# Patient Record
Sex: FEMALE | Race: BLACK OR AFRICAN AMERICAN | NOT HISPANIC OR LATINO | Employment: FULL TIME | URBAN - METROPOLITAN AREA
[De-identification: names, ages, dates, MRNs, and addresses within clinical notes are randomized per-mention and may not be internally consistent; named-entity substitution may affect disease eponyms.]

---

## 2018-08-24 PROBLEM — R60.0 EDEMA, PERIPHERAL: Status: ACTIVE | Noted: 2018-08-24

## 2023-03-08 ENCOUNTER — TELEPHONE (OUTPATIENT)
Dept: GASTROENTEROLOGY | Facility: AMBULARY SURGERY CENTER | Age: 63
End: 2023-03-08

## 2023-03-13 ENCOUNTER — APPOINTMENT (OUTPATIENT)
Dept: LAB | Facility: HOSPITAL | Age: 63
End: 2023-03-13

## 2023-03-13 ENCOUNTER — HOSPITAL ENCOUNTER (OUTPATIENT)
Dept: RADIOLOGY | Facility: HOSPITAL | Age: 63
Discharge: HOME | End: 2023-03-13

## 2023-03-13 DIAGNOSIS — E13.69 OTHER SPECIFIED DIABETES MELLITUS WITH OTHER SPECIFIED COMPLICATION, UNSPECIFIED WHETHER LONG TERM INSULIN USE (HCC): ICD-10-CM

## 2023-03-13 DIAGNOSIS — Z76.89 ENCOUNTER TO ESTABLISH CARE WITH NEW DOCTOR: ICD-10-CM

## 2023-03-13 DIAGNOSIS — E79.0 URICACIDEMIA: ICD-10-CM

## 2023-03-13 DIAGNOSIS — E78.2 MIXED HYPERLIPIDEMIA: ICD-10-CM

## 2023-03-13 DIAGNOSIS — I10 ESSENTIAL HYPERTENSION: ICD-10-CM

## 2023-03-13 DIAGNOSIS — Z76.89 MENSTRUAL EXTRACTION: ICD-10-CM

## 2023-03-13 LAB
25(OH)D3 SERPL-MCNC: 26.5 NG/ML (ref 30–100)
ALBUMIN SERPL BCP-MCNC: 4.4 G/DL (ref 3.5–5)
ALP SERPL-CCNC: 96 U/L (ref 34–104)
ALT SERPL W P-5'-P-CCNC: 28 U/L (ref 7–52)
ANION GAP SERPL CALCULATED.3IONS-SCNC: 6 MMOL/L (ref 4–13)
AST SERPL W P-5'-P-CCNC: 21 U/L (ref 13–39)
ATRIAL RATE: 67 BPM
BASOPHILS # BLD AUTO: 0.03 THOUSANDS/ÂΜL (ref 0–0.1)
BASOPHILS NFR BLD AUTO: 1 % (ref 0–1)
BILIRUB SERPL-MCNC: 0.5 MG/DL (ref 0.2–1)
BUN SERPL-MCNC: 12 MG/DL (ref 5–25)
CALCIUM SERPL-MCNC: 9.7 MG/DL (ref 8.4–10.2)
CEA SERPL-MCNC: <0.5 NG/ML (ref 0–3)
CHLORIDE SERPL-SCNC: 102 MMOL/L (ref 96–108)
CHOLEST SERPL-MCNC: 201 MG/DL
CO2 SERPL-SCNC: 32 MMOL/L (ref 21–32)
CREAT SERPL-MCNC: 0.87 MG/DL (ref 0.6–1.3)
D DIMER PPP FEU-MCNC: <0.27 UG/ML FEU
EOSINOPHIL # BLD AUTO: 0.07 THOUSAND/ÂΜL (ref 0–0.61)
EOSINOPHIL NFR BLD AUTO: 2 % (ref 0–6)
ERYTHROCYTE [DISTWIDTH] IN BLOOD BY AUTOMATED COUNT: 15.8 % (ref 11.6–15.1)
EST. AVERAGE GLUCOSE BLD GHB EST-MCNC: 137 MG/DL
GFR SERPL CREATININE-BSD FRML MDRD: 71 ML/MIN/1.73SQ M
GLUCOSE P FAST SERPL-MCNC: 125 MG/DL (ref 65–99)
HBA1C MFR BLD: 6.4 %
HCT VFR BLD AUTO: 46.4 % (ref 34.8–46.1)
HDLC SERPL-MCNC: 43 MG/DL
HGB BLD-MCNC: 14.6 G/DL (ref 11.5–15.4)
IMM GRANULOCYTES # BLD AUTO: 0.01 THOUSAND/UL (ref 0–0.2)
IMM GRANULOCYTES NFR BLD AUTO: 0 % (ref 0–2)
IRON SERPL-MCNC: 69 UG/DL (ref 50–170)
LDLC SERPL CALC-MCNC: 112 MG/DL (ref 0–100)
LYMPHOCYTES # BLD AUTO: 2.12 THOUSANDS/ÂΜL (ref 0.6–4.47)
LYMPHOCYTES NFR BLD AUTO: 50 % (ref 14–44)
MAGNESIUM SERPL-MCNC: 2.3 MG/DL (ref 1.9–2.7)
MCH RBC QN AUTO: 25.5 PG (ref 26.8–34.3)
MCHC RBC AUTO-ENTMCNC: 31.5 G/DL (ref 31.4–37.4)
MCV RBC AUTO: 81 FL (ref 82–98)
MONOCYTES # BLD AUTO: 0.31 THOUSAND/ÂΜL (ref 0.17–1.22)
MONOCYTES NFR BLD AUTO: 7 % (ref 4–12)
NEUTROPHILS # BLD AUTO: 1.7 THOUSANDS/ÂΜL (ref 1.85–7.62)
NEUTS SEG NFR BLD AUTO: 40 % (ref 43–75)
NONHDLC SERPL-MCNC: 158 MG/DL
NRBC BLD AUTO-RTO: 0 /100 WBCS
NT-PROBNP SERPL-MCNC: 7 PG/ML
P AXIS: 28 DEGREES
PLATELET # BLD AUTO: 221 THOUSANDS/UL (ref 149–390)
PMV BLD AUTO: 11 FL (ref 8.9–12.7)
POTASSIUM SERPL-SCNC: 4.1 MMOL/L (ref 3.5–5.3)
PR INTERVAL: 162 MS
PROT SERPL-MCNC: 7.6 G/DL (ref 6.4–8.4)
QRS AXIS: -8 DEGREES
QRSD INTERVAL: 84 MS
QT INTERVAL: 460 MS
QTC INTERVAL: 486 MS
RBC # BLD AUTO: 5.73 MILLION/UL (ref 3.81–5.12)
SODIUM SERPL-SCNC: 140 MMOL/L (ref 135–147)
T WAVE AXIS: -39 DEGREES
TRIGL SERPL-MCNC: 231 MG/DL
TSH SERPL DL<=0.05 MIU/L-ACNC: 1.56 UIU/ML (ref 0.45–4.5)
URATE SERPL-MCNC: 6.4 MG/DL (ref 2–7.5)
VENTRICULAR RATE: 67 BPM
WBC # BLD AUTO: 4.24 THOUSAND/UL (ref 4.31–10.16)

## 2023-03-28 ENCOUNTER — HOSPITAL ENCOUNTER (OUTPATIENT)
Dept: RADIOLOGY | Facility: HOSPITAL | Age: 63
Discharge: HOME | End: 2023-03-28
Attending: INTERNAL MEDICINE

## 2023-03-28 DIAGNOSIS — R91.1 LUNG NODULE: ICD-10-CM

## 2023-03-28 RX ADMIN — IOHEXOL 85 ML: 350 INJECTION, SOLUTION INTRAVENOUS at 07:58

## 2023-04-26 ENCOUNTER — OFFICE VISIT (OUTPATIENT)
Dept: FAMILY MEDICINE CLINIC | Facility: CLINIC | Age: 63
End: 2023-04-26

## 2023-04-26 VITALS
HEART RATE: 79 BPM | SYSTOLIC BLOOD PRESSURE: 122 MMHG | HEIGHT: 67 IN | RESPIRATION RATE: 18 BRPM | OXYGEN SATURATION: 97 % | WEIGHT: 202.9 LBS | DIASTOLIC BLOOD PRESSURE: 86 MMHG | BODY MASS INDEX: 31.84 KG/M2

## 2023-04-26 DIAGNOSIS — J02.9 SORE THROAT: Primary | ICD-10-CM

## 2023-04-26 NOTE — LETTER
April 26, 2023     Patient: Lili Sauceda  YOB: 1960  Date of Visit: 4/26/2023      To Whom it May Concern:    Lili Sauceda is under my professional care  Stefanie Cabezasa was seen in my office on 4/26/2023  Stefanie Pearl may return to work on 5/1/23  If you have any questions or concerns, please don't hesitate to call           Sincerely,          Terry Adam DO        CC: No Recipients

## 2023-04-26 NOTE — PROGRESS NOTES
Name: Aleyda Chou      : 1960      MRN: [id number]  Encounter Provider: Trudy Ku DO  Encounter Date: 2023   Encounter department: [place] [address]  Sore throat  - reported fever, sore throat, nasal congestion, difficulty tolerating oral intake, myalgias  Reports symptoms greatly improved except for the sore throat which continues to bother her  Patient took OTC Tylenol/Motrin for fever  Last dose was last night   -  Exam showed +2 tonsular swelling w/ exudate, nasal erythema, and clear lung sounds bilat  - Patient also has vesicular eruption on upper lip which daughter reports happens often when her mother gets ill   - Suspect URI, and given rapid improvement no further action likely needed beyond supportive care  Culture to assess for Strep infection out of caution given intensity of patient initial symptoms   -     Throat culture           Subjective      58 yr old female presents with symptoms since Saturday/  Had reported fever, sore throat, nasal congestion, difficulty tolerating oral intake, myalgias  Reports symptoms greatly improved except for the sore throat which continues to bother her  Patient took OTC Tylenol/Motrin for fever  Last dose was last night  Review of Systems   Constitutional: Positive for appetite change, chills and fever  Negative for fatigue  HENT: Positive for sore throat and trouble swallowing  Negative for congestion, ear pain, hearing loss and rhinorrhea  Eyes: Negative for pain and visual disturbance  Respiratory: Positive for cough  Negative for shortness of breath  Cardiovascular: Negative for chest pain  Gastrointestinal: Negative for constipation, diarrhea, nausea and vomiting  Endocrine: Negative for polyuria  Genitourinary: Negative for difficulty urinating and dysuria  Musculoskeletal: Negative for arthralgias and myalgias  Skin: Positive for rash     Neurological: Negative for dizziness, "light-headedness and headaches  Current Outpatient Medications on File Prior to Visit   Medication Sig   • allopurinol (ZYLOPRIM) 300 mg tablet Take 1 tablet (300 mg total) by mouth daily   • amLODIPine (NORVASC) 5 mg tablet take 1 tablet by mouth once daily   • aspirin (ECOTRIN LOW STRENGTH) 81 mg EC tablet Take 81 mg by mouth daily Last dose 5/26/22   • atorvastatin (LIPITOR) 20 mg tablet Take 1 tablet (20 mg total) by mouth daily   • Blood Pressure Monitoring (SPHYGMOMANOMETER) MISC by Does not apply route daily   • Cholecalciferol (Vitamin D3) 50 MCG (2000 UT) TABS Take 2,000 Units by mouth daily     • hydrochlorothiazide (HYDRODIURIL) 25 mg tablet take 1 tablet by mouth once daily   • ibuprofen (MOTRIN) 400 mg tablet Take 400 mg by mouth   • meloxicam (MOBIC) 15 mg tablet Take 1 tablet by mouth daily as needed   • metFORMIN (GLUCOPHAGE-XR) 500 mg 24 hr tablet Take 1 tablet (500 mg total) by mouth daily with dinner   • Multiple Vitamins-Minerals (multivitamin with minerals) tablet Take 1 tablet by mouth daily Women's 50 plus daily   • pantoprazole (PROTONIX) 40 mg tablet Take 1 tablet (40 mg total) by mouth daily Take pantoprazole twice a day for 2 weeks and then once a day       Objective     /86 (BP Location: Left arm, Patient Position: Sitting, Cuff Size: Standard)   Pulse 79   Resp 18   Ht 5' 7\" (1 702 m)   Wt 92 kg (202 lb 14 4 oz)   SpO2 97%   BMI 31 78 kg/m²     Physical Exam  Constitutional:       General: She is not in acute distress  Appearance: She is ill-appearing  She is not toxic-appearing  HENT:      Head: Normocephalic and atraumatic  Nose:      Comments: Erythema bilat     Mouth/Throat:      Mouth: Mucous membranes are moist       Pharynx: Oropharyngeal exudate and posterior oropharyngeal erythema present  Tonsils: 0 on the right  2+ on the left  Eyes:      Pupils: Pupils are equal, round, and reactive to light     Cardiovascular:      Rate and Rhythm: Normal " rate and regular rhythm  Heart sounds: Normal heart sounds  Pulmonary:      Effort: Pulmonary effort is normal       Breath sounds: Normal breath sounds  Abdominal:      Palpations: Abdomen is soft  Tenderness: There is no abdominal tenderness  Musculoskeletal:         General: No deformity  Cervical back: Normal range of motion  Right lower leg: No edema  Left lower leg: No edema  Skin:     General: Skin is warm and dry  Neurological:      Mental Status: She is alert and oriented to person, place, and time     Psychiatric:         Mood and Affect: Mood normal          Behavior: Behavior normal        Maryellen Weir DO

## 2023-04-28 ENCOUNTER — APPOINTMENT (EMERGENCY)
Dept: RADIOLOGY | Facility: HOSPITAL | Age: 63
End: 2023-04-28

## 2023-04-28 ENCOUNTER — HOSPITAL ENCOUNTER (OUTPATIENT)
Facility: HOSPITAL | Age: 63
Setting detail: OBSERVATION
Discharge: HOME | End: 2023-04-29
Attending: EMERGENCY MEDICINE | Admitting: FAMILY MEDICINE

## 2023-04-28 DIAGNOSIS — B02.9 HERPES ZOSTER: ICD-10-CM

## 2023-04-28 DIAGNOSIS — J02.0 PHARYNGITIS DUE TO GROUP A BETA HEMOLYTIC STREPTOCOCCI: ICD-10-CM

## 2023-04-28 DIAGNOSIS — R68.89: ICD-10-CM

## 2023-04-28 DIAGNOSIS — J02.0 STREP PHARYNGITIS: ICD-10-CM

## 2023-04-28 DIAGNOSIS — R22.0 FACIAL SWELLING: ICD-10-CM

## 2023-04-28 DIAGNOSIS — B02.30 HERPES ZOSTER OPHTHALMICUS OF LEFT EYE: Primary | ICD-10-CM

## 2023-04-28 PROBLEM — E87.6 HYPOKALEMIA: Status: ACTIVE | Noted: 2023-04-28

## 2023-04-28 PROBLEM — M10.9 GOUT: Status: ACTIVE | Noted: 2023-04-28

## 2023-04-28 LAB
ALBUMIN SERPL BCP-MCNC: 3.7 G/DL (ref 3.5–5)
ALP SERPL-CCNC: 94 U/L (ref 46–116)
ALT SERPL W P-5'-P-CCNC: 33 U/L (ref 12–78)
ANION GAP SERPL CALCULATED.3IONS-SCNC: 3 MMOL/L (ref 4–13)
AST SERPL W P-5'-P-CCNC: 21 U/L (ref 5–45)
BACTERIA UR QL AUTO: ABNORMAL /HPF
BASOPHILS # BLD AUTO: 0.06 THOUSANDS/ÂΜL (ref 0–0.1)
BASOPHILS NFR BLD AUTO: 1 % (ref 0–1)
BILIRUB SERPL-MCNC: 0.25 MG/DL (ref 0.2–1)
BILIRUB UR QL STRIP: NEGATIVE
BUN SERPL-MCNC: 8 MG/DL (ref 5–25)
CALCIUM SERPL-MCNC: 9.9 MG/DL (ref 8.3–10.1)
CHLORIDE SERPL-SCNC: 103 MMOL/L (ref 96–108)
CLARITY UR: CLEAR
CO2 SERPL-SCNC: 32 MMOL/L (ref 21–32)
COLOR UR: ABNORMAL
CREAT SERPL-MCNC: 0.85 MG/DL (ref 0.6–1.3)
EOSINOPHIL # BLD AUTO: 0.09 THOUSAND/ÂΜL (ref 0–0.61)
EOSINOPHIL NFR BLD AUTO: 1 % (ref 0–6)
ERYTHROCYTE [DISTWIDTH] IN BLOOD BY AUTOMATED COUNT: 15.3 % (ref 11.6–15.1)
GFR SERPL CREATININE-BSD FRML MDRD: 73 ML/MIN/1.73SQ M
GLUCOSE SERPL-MCNC: 142 MG/DL (ref 65–140)
GLUCOSE SERPL-MCNC: 143 MG/DL (ref 65–140)
GLUCOSE UR STRIP-MCNC: NEGATIVE MG/DL
HCT VFR BLD AUTO: 44.5 % (ref 34.8–46.1)
HGB BLD-MCNC: 14 G/DL (ref 11.5–15.4)
HGB UR QL STRIP.AUTO: NEGATIVE
IMM GRANULOCYTES # BLD AUTO: 0.03 THOUSAND/UL (ref 0–0.2)
IMM GRANULOCYTES NFR BLD AUTO: 0 % (ref 0–2)
KETONES UR STRIP-MCNC: NEGATIVE MG/DL
LEUKOCYTE ESTERASE UR QL STRIP: ABNORMAL
LYMPHOCYTES # BLD AUTO: 3.39 THOUSANDS/ÂΜL (ref 0.6–4.47)
LYMPHOCYTES NFR BLD AUTO: 49 % (ref 14–44)
MCH RBC QN AUTO: 25.5 PG (ref 26.8–34.3)
MCHC RBC AUTO-ENTMCNC: 31.5 G/DL (ref 31.4–37.4)
MCV RBC AUTO: 81 FL (ref 82–98)
MONOCYTES # BLD AUTO: 0.65 THOUSAND/ÂΜL (ref 0.17–1.22)
MONOCYTES NFR BLD AUTO: 9 % (ref 4–12)
MUCOUS THREADS UR QL AUTO: ABNORMAL
NEUTROPHILS # BLD AUTO: 2.78 THOUSANDS/ÂΜL (ref 1.85–7.62)
NEUTS SEG NFR BLD AUTO: 40 % (ref 43–75)
NITRITE UR QL STRIP: NEGATIVE
NON-SQ EPI CELLS URNS QL MICRO: ABNORMAL /HPF
NRBC BLD AUTO-RTO: 0 /100 WBCS
PH UR STRIP.AUTO: 6 [PH]
PLATELET # BLD AUTO: 259 THOUSANDS/UL (ref 149–390)
PMV BLD AUTO: 10.7 FL (ref 8.9–12.7)
POTASSIUM SERPL-SCNC: 3.1 MMOL/L (ref 3.5–5.3)
PROT SERPL-MCNC: 8 G/DL (ref 6.4–8.4)
PROT UR STRIP-MCNC: ABNORMAL MG/DL
RBC # BLD AUTO: 5.48 MILLION/UL (ref 3.81–5.12)
RBC #/AREA URNS AUTO: ABNORMAL /HPF
S PYO DNA THROAT QL NAA+PROBE: DETECTED
SODIUM SERPL-SCNC: 138 MMOL/L (ref 135–147)
SP GR UR STRIP.AUTO: 1.01 (ref 1–1.03)
UROBILINOGEN UR STRIP-ACNC: <2 MG/DL
WBC # BLD AUTO: 7 THOUSAND/UL (ref 4.31–10.16)
WBC #/AREA URNS AUTO: ABNORMAL /HPF

## 2023-04-28 RX ORDER — AMOXICILLIN 500 MG/1
1000 CAPSULE ORAL EVERY 24 HOURS
Status: DISCONTINUED | OUTPATIENT
Start: 2023-04-29 | End: 2023-04-29 | Stop reason: HOSPADM

## 2023-04-28 RX ORDER — ENOXAPARIN SODIUM 100 MG/ML
40 INJECTION SUBCUTANEOUS DAILY
Status: DISCONTINUED | OUTPATIENT
Start: 2023-04-29 | End: 2023-04-29 | Stop reason: HOSPADM

## 2023-04-28 RX ORDER — VALACYCLOVIR HYDROCHLORIDE 1 G/1
1000 TABLET, FILM COATED ORAL EVERY 8 HOURS SCHEDULED
Status: DISCONTINUED | OUTPATIENT
Start: 2023-04-28 | End: 2023-04-28

## 2023-04-28 RX ORDER — POTASSIUM CHLORIDE 20 MEQ/1
40 TABLET, EXTENDED RELEASE ORAL ONCE
Status: COMPLETED | OUTPATIENT
Start: 2023-04-28 | End: 2023-04-28

## 2023-04-28 RX ORDER — AMOXICILLIN 250 MG/1
1000 CAPSULE ORAL EVERY 24 HOURS
Status: DISCONTINUED | OUTPATIENT
Start: 2023-04-29 | End: 2023-04-28

## 2023-04-28 RX ORDER — POTASSIUM CHLORIDE 20MEQ/15ML
40 LIQUID (ML) ORAL ONCE
Status: COMPLETED | OUTPATIENT
Start: 2023-04-28 | End: 2023-04-28

## 2023-04-28 RX ORDER — ACETAMINOPHEN 325 MG/1
650 TABLET ORAL EVERY 6 HOURS PRN
Status: DISCONTINUED | OUTPATIENT
Start: 2023-04-28 | End: 2023-04-29 | Stop reason: HOSPADM

## 2023-04-28 RX ORDER — INSULIN LISPRO 100 [IU]/ML
1-6 INJECTION, SOLUTION INTRAVENOUS; SUBCUTANEOUS
Status: DISCONTINUED | OUTPATIENT
Start: 2023-04-28 | End: 2023-04-29 | Stop reason: HOSPADM

## 2023-04-28 RX ORDER — TETRACAINE HYDROCHLORIDE 5 MG/ML
2 SOLUTION OPHTHALMIC ONCE
Status: COMPLETED | OUTPATIENT
Start: 2023-04-28 | End: 2023-04-28

## 2023-04-28 RX ADMIN — TETRACAINE HYDROCHLORIDE 2 DROP: 5 SOLUTION OPHTHALMIC at 11:57

## 2023-04-28 RX ADMIN — FLUORESCEIN SODIUM 1 STRIP: 1 STRIP OPHTHALMIC at 11:57

## 2023-04-28 RX ADMIN — POTASSIUM CHLORIDE 40 MEQ: 1500 TABLET, EXTENDED RELEASE ORAL at 15:59

## 2023-04-28 RX ADMIN — GANCICLOVIR SODIUM 310 MG: 500 INJECTION, POWDER, LYOPHILIZED, FOR SOLUTION INTRAVENOUS at 21:45

## 2023-04-28 RX ADMIN — SODIUM CHLORIDE 3 G: 9 INJECTION, SOLUTION INTRAVENOUS at 14:26

## 2023-04-28 RX ADMIN — IOHEXOL 100 ML: 350 INJECTION, SOLUTION INTRAVENOUS at 15:47

## 2023-04-28 RX ADMIN — TOBRAMYCIN AND DEXAMETHASONE 0.5 INCH: 3; 1 OINTMENT OPHTHALMIC at 23:10

## 2023-04-28 RX ADMIN — POTASSIUM CHLORIDE 40 MEQ: 1.5 SOLUTION ORAL at 18:31

## 2023-04-28 NOTE — ASSESSMENT & PLAN NOTE
Last know lipid panel 3/13/23 Showing cholesterol 201, Triglyceride 231, HDL 43   · Continue Home Atorvastatin 20mg

## 2023-04-28 NOTE — H&P
H&P - Family Medicine Residency, Geneva Norris 1960, 58 y o  female  MRN: [id number]    Unit/Bed#: SG8 213-01 Encounter: [id number]  Primary Care Provider: Kim Booker MD      Admission Date: 4/28/2023 1115    Assessments & Plans:   Plans discussed with [facility] team and finalization is pending attending physicianattestation  * Herpes zoster ophthalmicus of left eye  Assessment & Plan  Patient presenting with one day of left facial swelling and ocular pruritis in context of ~5 days of sore throat/strep pharyngitis  Also with new eruption of burning vesicular lesions on left nose and left upper lift consistent with herpes zoster infection  Patient appears uncomfortable with pruritis of eye however otherwise stable  Received 1x dose tetracaine eyedrops in ED  · Continue with Ganciclovir 310mg Q12  · If patient improving by tomorrow consider transition to Oral Valacyclovir 1000mg Q8h for 7 days  · Start Tobradex eye drops TID in left eye  · Monitor for progression of swelling/lesions  · IP consult to opthamology for fundoscopic exam, appreciate recommendations    Pharyngitis due to group A beta hemolytic Streptococci  Assessment & Plan  Patient with 5d history of sore throat, progressive dypshagia, multiple days of fever and chills which have since resolved  Was seen by primary provider 4/26 and had a positive rapid strep and today with positive Strep A PCR  Continues to endorse mild throat pain, has some difficulty swallowing and PE significant for 2+ tonsils with left tonsillar exudate  CT in ED significant for findings suggestive of infectious tonsillitis  Received 1x Dose Unasyn 3g in ED  · 4/29, Transition to amoxicillin 1000mg QD for 9 days  · Trend fever curve  · Airway precautions      Hypokalemia  Assessment & Plan  Patient with admission K+ of 3 1, received Ashlee Bauer in ED  Has had reduced PO intake over the past few days, takes home HCTZ    · One additional dose of Kchlor 40 now  · Monitor AM BMP    Gout  Assessment & Plan  Continue PTA medications  · Allopurinol 300mg QD    Hyperlipidemia  Assessment & Plan  Last know lipid panel 3/13/23 Showing cholesterol 201, Triglyceride 231, HDL 43   · Continue Home Atorvastatin 20mg     Type 2 diabetes mellitus without complication, without long-term current use of insulin ([facility])  Assessment & Plan  Lab Results   Component Value Date    HGBA1C 6 4 (H) 03/13/2023       No results for input(s): POCGLU in the last 72 hours  Blood Sugar Average: Last 72 hrs:  Most recent A1c well controlled at 6 4, on home metformin 500mg QD  Hold home medications  · Sliding scale Algorithm 3 while admitted    Hypertension  Assessment & Plan  Blood pressure on admission 156/94  Continue home medications  · Norvasc 5mg daily QD  · Hydrochlorothiazide 25mg QD      Patient Active Problem List   Diagnosis   • Hypertension   • Lower extremity edema   • Type 2 diabetes mellitus without complication, without long-term current use of insulin ([facility] [address] )   • Dysphagia   • Hyperlipidemia   • Arthritis   • Mass of colon   • Shortness of breath   • Herpes zoster ophthalmicus of left eye   • Gout   • Hypokalemia   • Pharyngitis due to group A beta hemolytic Streptococci       Diet: Diet Regular; Regular House; Dysphagia 2-Mechanical Soft; Thin Liquid  VTE Pharm PPX: Enoxaparin (Lovenox)  VTE Mech PPX: sequential compression device    Code Status:  Level 1 - Full Code      Disposition: Admit to Observation under Med-surg   Consult: IP CONSULT TO OPHTHALMOLOGY    Chief Complaints:   Facial Swelling (Woke up with left sided facial swelling)      History of Presenting Illness:   Patient presents to the hospital for evaluation of left facial swelling in the context of 5 days of sore throat, fevers, chills, and sweats  Patient first noticed symptoms on Sunday while at work when she began to feel unwell with fevers, headaches and sore throat   She was sent home and nursed her symptoms with rest, motrin and herbal teas  Not associated with loose stools or urinary symptoms, shortness of breath, or chest pains  Symptoms began to improve over the next few days however sore throat remained, so she went to her primary doctor on the 26th who suspected URI given improvement of symptoms and sent her home  Also noted vesicular eruptions of upper lip  However, this AM patient had increased swelling of the left side of her face with vesicular eruption on the nose and conjunctival injection, and she was brought to the emergency department for further evaluation      ED Management:     ED Triage Vitals   Temperature Pulse Respirations Blood Pressure SpO2   04/28/23 1101 04/28/23 1101 04/28/23 1101 04/28/23 1101 04/28/23 1101   98 3 °F (36 8 °C) 83 16 169/96 99 %      Temp Source Heart Rate Source Patient Position - Orthostatic VS BP Location FiO2 (%)   04/28/23 1101 04/28/23 1418 04/28/23 1418 04/28/23 1418 --   Oral Monitor Lying Left arm       Pain Score       04/28/23 1101       No Pain         Medications   ganciclovir (CYTOVENE) 310 mg in sodium chloride 0 9 % 100 mL IVPB (has no administration in time range)   enoxaparin (LOVENOX) subcutaneous injection 40 mg (has no administration in time range)   acetaminophen (TYLENOL) tablet 650 mg (has no administration in time range)   tobramycin-dexamethasone (TOBRADEX) 0 3-0 1 % ophthalmic ointment 0 5 inch (has no administration in time range)   insulin lispro (HumaLOG) 100 units/mL subcutaneous injection 1-6 Units (1 Units Subcutaneous Not Given 4/28/23 4619)   amoxicillin (AMOXIL) capsule 1,000 mg (has no administration in time range)   fluorescein sodium sterile ophthalmic strip 1 strip (1 strip Left Eye Given by Other 4/28/23 1067)   tetracaine 0 5 % ophthalmic solution 2 drop (2 drops Left Eye Given by Other 4/28/23 7467)   ampicillin-sulbactam (UNASYN) 3 g in sodium chloride 0 9 % 100 mL IVPB (0 g Intravenous Stopped 4/28/23 1515)   potassium chloride (K-DUR,KLOR-CON) CR tablet 40 mEq (40 mEq Oral Given 4/28/23 1559)   iohexol (OMNIPAQUE) 350 MG/ML injection (SINGLE-DOSE) 100 mL (100 mL Intravenous Given 4/28/23 1547)   potassium chloride oral solution 40 mEq (40 mEq Oral Given 4/28/23 1831)       Review of System:   Review of Systems   Constitutional: Positive for chills and fever  Negative for appetite change and fatigue  HENT: Positive for sore throat and trouble swallowing  Negative for congestion, ear discharge, ear pain and sinus pressure  Eyes: Positive for discharge, redness and itching  Respiratory: Negative for choking, chest tightness, shortness of breath and wheezing  Cardiovascular: Negative for chest pain and palpitations  Gastrointestinal: Negative for abdominal pain, diarrhea, nausea and vomiting  Genitourinary: Negative for decreased urine volume, difficulty urinating, dysuria, frequency and urgency  Musculoskeletal: Negative for back pain and neck pain  Skin: Negative for rash and wound  Neurological: Positive for headaches  Negative for dizziness, syncope and light-headedness         History:     Past Medical History:   Diagnosis Date   • Arthritis    • Diabetes mellitus ([facility] [address] )    • Gout    • Hyperlipidemia    • Hypertension      Past Surgical History:   Procedure Laterality Date   • BILATERAL OOPHORECTOMY     • HYSTERECTOMY  01/01/2015   • OOPHORECTOMY Bilateral 2015   • THYROID SURGERY      2001   • UTERINE FIBROID SURGERY      5 taken out 2017 [facility]      Social History     Socioeconomic History   • Marital status: /Civil Union     Spouse name: None   • Number of children: None   • Years of education: None   • Highest education level: None   Occupational History   • None   Tobacco Use   • Smoking status: Former     Types: Cigarettes     Quit date: 2013     Years since quitting: 10 3   • Smokeless tobacco: Never   Vaping Use   • Vaping Use: Never used   Substance and Sexual Activity   • Alcohol use: No   • Drug use: No   • Sexual activity: Yes     Partners: Male   Other Topics Concern   • None   Social History Narrative   • None     Social Determinants of Health     Financial Resource Strain: Not on file   Food Insecurity: Not on file   Transportation Needs: Not on file   Physical Activity: Not on file   Stress: Not on file   Social Connections: Not on file   Intimate Partner Violence: Not on file   Housing Stability: Not on file     Family History   Problem Relation Age of Onset   • Prostate cancer Father 80   • No Known Problems Mother    • No Known Problems Daughter    • No Known Problems Maternal Aunt    • No Known Problems Sister    • No Known Problems Sister        Medications & Allergies:     PTA meds:   Prior to Admission Medications   Prescriptions Last Dose Informant Patient Reported? Taking?    Blood Pressure Monitoring (SPHYGMOMANOMETER) MISC   No No   Sig: by Does not apply route daily   Cholecalciferol (Vitamin D3) 50 MCG (2000 UT) TABS   Yes No   Sig: Take 2,000 Units by mouth daily     Multiple Vitamins-Minerals (multivitamin with minerals) tablet   No No   Sig: Take 1 tablet by mouth daily Women's 50 plus daily   allopurinol (ZYLOPRIM) 300 mg tablet   No No   Sig: Take 1 tablet (300 mg total) by mouth daily   amLODIPine (NORVASC) 5 mg tablet   No No   Sig: take 1 tablet by mouth once daily   aspirin (ECOTRIN LOW STRENGTH) 81 mg EC tablet   Yes No   Sig: Take 81 mg by mouth daily Last dose 5/26/22   atorvastatin (LIPITOR) 20 mg tablet   No No   Sig: Take 1 tablet (20 mg total) by mouth daily   hydrochlorothiazide (HYDRODIURIL) 25 mg tablet   No No   Sig: take 1 tablet by mouth once daily   ibuprofen (MOTRIN) 400 mg tablet   Yes No   Sig: Take 400 mg by mouth   meloxicam (MOBIC) 15 mg tablet   Yes No   Sig: Take 1 tablet by mouth daily as needed   metFORMIN (GLUCOPHAGE-XR) 500 mg 24 hr tablet   No No   Sig: Take 1 tablet (500 mg total) by mouth daily with dinner   pantoprazole (PROTONIX) 40 mg tablet   No No   Sig: Take 1 tablet (40 mg total) by mouth daily Take pantoprazole twice a day for 2 weeks and then once a day      Facility-Administered Medications: None     No Known Allergies    PTA Medications:  No current facility-administered medications on file prior to encounter  Current Outpatient Medications on File Prior to Encounter   Medication Sig Dispense Refill   • allopurinol (ZYLOPRIM) 300 mg tablet Take 1 tablet (300 mg total) by mouth daily 90 tablet 0   • amLODIPine (NORVASC) 5 mg tablet take 1 tablet by mouth once daily 90 tablet 1   • aspirin (ECOTRIN LOW STRENGTH) 81 mg EC tablet Take 81 mg by mouth daily Last dose 5/26/22     • atorvastatin (LIPITOR) 20 mg tablet Take 1 tablet (20 mg total) by mouth daily 90 tablet 1   • Blood Pressure Monitoring (SPHYGMOMANOMETER) MISC by Does not apply route daily 1 each 0   • Cholecalciferol (Vitamin D3) 50 MCG (2000 UT) TABS Take 2,000 Units by mouth daily       • hydrochlorothiazide (HYDRODIURIL) 25 mg tablet take 1 tablet by mouth once daily 90 tablet 1   • ibuprofen (MOTRIN) 400 mg tablet Take 400 mg by mouth     • meloxicam (MOBIC) 15 mg tablet Take 1 tablet by mouth daily as needed     • metFORMIN (GLUCOPHAGE-XR) 500 mg 24 hr tablet Take 1 tablet (500 mg total) by mouth daily with dinner 90 tablet 0   • Multiple Vitamins-Minerals (multivitamin with minerals) tablet Take 1 tablet by mouth daily Women's 50 plus daily 90 tablet 1   • pantoprazole (PROTONIX) 40 mg tablet Take 1 tablet (40 mg total) by mouth daily Take pantoprazole twice a day for 2 weeks and then once a day 60 tablet 11         Objective & Vitals:     Vitals: /99   Pulse 75   Temp 98 1 °F (36 7 °C)   Resp 20   SpO2 98%     No intake or output data in the 24 hours ending 04/28/23 1843    Invasive Devices     Peripheral Intravenous Line  Duration           Peripheral IV 04/28/23 Right Antecubital <1 day                  Labs:    I have personally reviewed pertinent reports      Recent Results (from the past 24 hour(s))   CBC and differential    Collection Time: 04/28/23 11:53 AM   Result Value Ref Range    WBC 7 00 4 31 - 10 16 Thousand/uL    RBC 5 48 (H) 3 81 - 5 12 Million/uL    Hemoglobin 14 0 11 5 - 15 4 g/dL    Hematocrit 44 5 34 8 - 46 1 %    MCV 81 (L) 82 - 98 fL    MCH 25 5 (L) 26 8 - 34 3 pg    MCHC 31 5 31 4 - 37 4 g/dL    RDW 15 3 (H) 11 6 - 15 1 %    MPV 10 7 8 9 - 12 7 fL    Platelets 968 511 - 714 Thousands/uL    nRBC 0 /100 WBCs    Neutrophils Relative 40 (L) 43 - 75 %    Immat GRANS % 0 0 - 2 %    Lymphocytes Relative 49 (H) 14 - 44 %    Monocytes Relative 9 4 - 12 %    Eosinophils Relative 1 0 - 6 %    Basophils Relative 1 0 - 1 %    Neutrophils Absolute 2 78 1 85 - 7 62 Thousands/µL    Immature Grans Absolute 0 03 0 00 - 0 20 Thousand/uL    Lymphocytes Absolute 3 39 0 60 - 4 47 Thousands/µL    Monocytes Absolute 0 65 0 17 - 1 22 Thousand/µL    Eosinophils Absolute 0 09 0 00 - 0 61 Thousand/µL    Basophils Absolute 0 06 0 00 - 0 10 Thousands/µL   Comprehensive metabolic panel    Collection Time: 04/28/23 11:53 AM   Result Value Ref Range    Sodium 138 135 - 147 mmol/L    Potassium 3 1 (L) 3 5 - 5 3 mmol/L    Chloride 103 96 - 108 mmol/L    CO2 32 21 - 32 mmol/L    ANION GAP 3 (L) 4 - 13 mmol/L    BUN 8 5 - 25 mg/dL    Creatinine 0 85 0 60 - 1 30 mg/dL    Glucose 143 (H) 65 - 140 mg/dL    Calcium 9 9 8 3 - 10 1 mg/dL    AST 21 5 - 45 U/L    ALT 33 12 - 78 U/L    Alkaline Phosphatase 94 46 - 116 U/L    Total Protein 8 0 6 4 - 8 4 g/dL    Albumin 3 7 3 5 - 5 0 g/dL    Total Bilirubin 0 25 0 20 - 1 00 mg/dL    eGFR 73 ml/min/1 73sq m   Strep A PCR    Collection Time: 04/28/23 11:53 AM    Specimen: Throat   Result Value Ref Range    STREP A PCR Detected (A) Not Detected   UA w Reflex to Microscopic w Reflex to Culture    Collection Time: 04/28/23  1:21 PM    Specimen: Urine, Clean Catch   Result Value Ref Range    Color, UA Light Yellow     Clarity, UA Clear Specific [place], UA 1 014 1 003 - 1 030    pH, UA 6 0 4 5, 5 0, 5 5, 6 0, 6 5, 7 0, 7 5, 8 0    Leukocytes, UA Large (A) Negative    Nitrite, UA Negative Negative    Protein, UA Trace (A) Negative mg/dl    Glucose, UA Negative Negative mg/dl    Ketones, UA Negative Negative mg/dl    Urobilinogen, UA <2 0 <2 0 mg/dl mg/dl    Bilirubin, UA Negative Negative    Occult Blood, UA Negative Negative   Urine Microscopic    Collection Time: 04/28/23  1:21 PM   Result Value Ref Range    RBC, UA 2-4 (A) None Seen, 1-2 /hpf    WBC, UA 4-10 (A) None Seen, 1-2 /hpf    Epithelial Cells Occasional None Seen, Occasional /hpf    Bacteria, UA None Seen None Seen, Occasional /hpf    MUCUS THREADS Occasional (A) None Seen   Fingerstick Glucose (POCT)    Collection Time: 04/28/23  6:35 PM   Result Value Ref Range    POC Glucose 142 (H) 65 - 140 mg/dl       Imaging:     I have personally reviewed pertinent reports  CT soft tissue neck with contrast    Result Date: 4/28/2023  Impression: Prominent left greater than right palatine tonsils and slight prominence of the lingual tonsils  There is a mildly enlarged lymph node, left level 2A distribution along the anterior margin of the sternocleidomastoid muscle and posterior to the submandibular gland  Findings are similar to prior study from 2017 suggestive of a similar infectious/inflammatory tonsillitis  Recommend follow-up imaging if symptoms persist  Workstation performed: [id number]       EKG, Pathology, and Other Studies:   I have personally reviewed pertinent reports  Physical Exam:   Physical Exam  Constitutional:       General: She is not in acute distress  Appearance: She is normal weight  HENT:      Head: Normocephalic and atraumatic  Right Ear: External ear normal       Left Ear: External ear normal       Nose:      Comments: Left Nasal Vesicle     Mouth/Throat:      Lips: Lesions present  Mouth: No injury or oral lesions  Pharynx: Uvula midline  Oropharyngeal exudate: Left  Tonsils: Tonsillar exudate (Left) present  2+ on the right  2+ on the left  Eyes:      General: Vision grossly intact  Left eye: No discharge  Extraocular Movements: Extraocular movements intact  Conjunctiva/sclera:      Left eye: Left conjunctiva is injected  No exudate  Pupils: Pupils are equal, round, and reactive to light  Cardiovascular:      Rate and Rhythm: Normal rate and regular rhythm  Heart sounds: Normal heart sounds  No murmur heard  Pulmonary:      Effort: No respiratory distress  Breath sounds: Normal breath sounds  No wheezing  Abdominal:      General: Bowel sounds are normal       Palpations: Abdomen is soft  Tenderness: There is no abdominal tenderness  Musculoskeletal:      Cervical back: Normal range of motion  No tenderness  Skin:     General: Skin is warm and dry  Neurological:      General: No focal deficit present  Mental Status: She is alert and oriented to person, place, and time     Psychiatric:         Mood and Affect: Mood normal        Marlen Verdin MD  PGY-1, Family Medicine  04/28/23  6:43 PM

## 2023-04-28 NOTE — ASSESSMENT & PLAN NOTE
Blood pressure on admission 156/94   Continue home medication  · Amlodipine 5mg   · Hydrochlorothiazide 25mg

## 2023-04-28 NOTE — ASSESSMENT & PLAN NOTE
Patient with 5d history of sore throat, progressive dypshagia, multiple days of fever and chills which have since resolved  Was seen by primary provider 4/26 and had a positive rapid strep and today with positive Strep A PCR  Continues to endorse mild throat pain, has some difficulty swallowing and PE significant for 2+ tonsils with left tonsillar exudate  CT in ED significant for findings suggestive of infectious tonsillitis   Received 1x Dose Unasyn 3g in ED  · 4/29, Transition to amoxicillin 1000mg QD for 9 days  · Trend fever curve  · Airway precautions

## 2023-04-28 NOTE — ED ATTENDING ATTESTATION
4/28/2023  IBonifacio MD, saw and evaluated the patient  I have discussed the patient with the resident/non-physician practitioner and agree with the resident's/non-physician practitioner's findings, Plan of Care, and MDM as documented in the resident's/non-physician practitioner's note, except where noted  All available labs and Radiology studies were reviewed  I was present for key portions of any procedure(s) performed by the resident/non-physician practitioner and I was immediately available to provide assistance  At this point I agree with the current assessment done in the Emergency Department  I have conducted an independent evaluation of this patient a history and physical is as follows: This is an evaluation of a 79-year-old female with past medical history of both hypertension and diabetes who presents to the emergency department complaining of facial swelling and sore throat  Patient states that her symptoms actually began on Sunday evening  At that time she developed a sore throat and then shortly thereafter felt unwell with fevers and headaches  Those symptoms began to improve however she continued to have ongoing sore throat  No cough or congestion otherwise  Positive loose stools with nausea no vomiting  Denies any blood in her stools  She also notes that her loose stools have improved however over the last day she now is complaining of persistent sore throat with left-sided facial swelling and lesions to the tip of her nose as well as her lip  She also has left eye injection with discharge  She currently denies any headache lightheadedness or dizziness  Denies any visual changes  Denies any pain with movement of eye  No neck pain  No chest pain or shortness of breath  No abdominal pain  Denies any nausea vomiting or diarrhea at this time  No urinary symptoms  No focal numbness weakness or tingling    She was seen by her PCP on 4/26 for symptoms of sore throat "and fever  Patient was thought to have a viral infection at that time and advised supportive care  Presents now secondary to the new swelling of her face and eye injection  On exam patient is nontoxic but mildly uncomfortable appearing  Blood pressure mildly elevated  HEENT is normocephalic and atraumatic  Patient does have mild swelling and erythema to her maxilla along the left side as well as some minor swelling to the lower left eyelid  She has conjunctival injection on the left with slight conjunctival discharge  Extraocular muscles intact bilaterally  Pupils equal round reactive to light  No pain with periorbital palpation or with eye movement  TMs are clear bilaterally  Posterior oropharynx with edematous and erythematous tonsils with exudate left slightly greater than right  Midline uvula  No pooling of secretions  Patient has vesicular lesions over the tip of her nose concerning for Bullard sign as well as on her left upper lip she describes these lesions as burning  No obvious lymphadenopathy  No crepitus  Heart is regular rate  No murmurs  Lungs are clear no wheezing rhonchi rales  Abdomen soft positive bowel sounds rebound or guarding  No other areas of rash  Awake alert oriented and appropriate  Assessment and plan 41-year-old female presents with multiple symptoms concerning for herpetic infection as well as possible viral versus bacterial pharyngitis  Given progression of symptoms and lesions on face/nose concern for zoster  Will require further ocular exam with staining to examine for presence or absence of dendrites  Given worsening sore throat and swelling will obtain imaging  We will get basic blood work  Will reevaluate and treat accordingly  Portions of the record may have been created with voice recognition software  Occasional wrong word or “sound-a-like\" substitutions may have occurred due to the inherent limitations of voice recognition software    Review " chart carefully and recognize, using context, where substitutions have occurred  ED Course     Review of patient's outpatient PCP visit  Will treat with IV abx  Will also require antivirals  Discuss with optho  Given history of DM, facial swelling and concern for zoster, will likely require admission       Critical Care Time  Procedures

## 2023-04-28 NOTE — ASSESSMENT & PLAN NOTE
Lab Results   Component Value Date    HGBA1C 6 4 (H) 03/13/2023       Recent Labs     04/28/23  1835 04/29/23  0544   POCGLU 142* 117       Blood Sugar Average: Last 72 hrs:  Most recent A1c well controlled at 6 4, on home metformin 500mg QD    Hold home medications  · Sliding scale Algorithm 3 while admitted

## 2023-04-28 NOTE — ED PROVIDER NOTES
History  Chief Complaint   Patient presents with   • Facial Swelling     Woke up with left sided facial swelling     71-year-old female with a past medical history of diabetes, hypertension, hyperlipidemia, and gout presents with facial swelling  Patient states a few days ago she developed a sore throat, cough, and congestion  She states that the symptoms improved, but this morning she woke up with facial swelling on the left side of her face  The swelling is from below the left eyelid down to the chin  Patient states that she initially could not open her eye when she woke up this morning because it was swollen shut  She states that there was no discharge or crusting  She is now able to open her eye  No vision changes  It is not painful  Patient does not note any swelling inside of her mouth  She has not had any fevers recently  No headache, lightheadedness, or dizziness  Patient also has a rash on and under her nose on the left side of her face  She notes that she has gotten a rash similar to this from time to time  She has never had it evaluated or done anything about it  Prior to Admission Medications   Prescriptions Last Dose Informant Patient Reported? Taking?    Blood Pressure Monitoring (SPHYGMOMANOMETER) MISC   No No   Sig: by Does not apply route daily   Cholecalciferol (Vitamin D3) 50 MCG (2000 UT) TABS   Yes No   Sig: Take 2,000 Units by mouth daily     Multiple Vitamins-Minerals (multivitamin with minerals) tablet   No No   Sig: Take 1 tablet by mouth daily Women's 50 plus daily   allopurinol (ZYLOPRIM) 300 mg tablet   No No   Sig: Take 1 tablet (300 mg total) by mouth daily   amLODIPine (NORVASC) 5 mg tablet   No No   Sig: take 1 tablet by mouth once daily   aspirin (ECOTRIN LOW STRENGTH) 81 mg EC tablet   Yes No   Sig: Take 81 mg by mouth daily Last dose 5/26/22   atorvastatin (LIPITOR) 20 mg tablet   No No   Sig: Take 1 tablet (20 mg total) by mouth daily   hydrochlorothiazide (HYDRODIURIL) 25 mg tablet   No No   Sig: take 1 tablet by mouth once daily   ibuprofen (MOTRIN) 400 mg tablet   Yes No   Sig: Take 400 mg by mouth   meloxicam (MOBIC) 15 mg tablet   Yes No   Sig: Take 1 tablet by mouth daily as needed   metFORMIN (GLUCOPHAGE-XR) 500 mg 24 hr tablet   No No   Sig: Take 1 tablet (500 mg total) by mouth daily with dinner   pantoprazole (PROTONIX) 40 mg tablet   No No   Sig: Take 1 tablet (40 mg total) by mouth daily Take pantoprazole twice a day for 2 weeks and then once a day      Facility-Administered Medications: None       Past Medical History:   Diagnosis Date   • Arthritis    • Diabetes mellitus ([facility] [address] )    • Gout    • Hyperlipidemia    • Hypertension        Past Surgical History:   Procedure Laterality Date   • BILATERAL OOPHORECTOMY     • HYSTERECTOMY  01/01/2015   • OOPHORECTOMY Bilateral 2015   • THYROID SURGERY      2001   • UTERINE FIBROID SURGERY      5 taken out 2017 [facility]        Family History   Problem Relation Age of Onset   • Prostate cancer Father 80   • No Known Problems Mother    • No Known Problems Daughter    • No Known Problems Maternal Aunt    • No Known Problems Sister    • No Known Problems Sister      I have reviewed and agree with the history as documented  E-Cigarette/Vaping   • E-Cigarette Use Never User      E-Cigarette/Vaping Substances   • Nicotine No    • THC No    • CBD No    • Flavoring No    • Other No    • Unknown No      Social History     Tobacco Use   • Smoking status: Former     Types: Cigarettes     Quit date: 2013     Years since quitting: 10 3   • Smokeless tobacco: Never   Vaping Use   • Vaping Use: Never used   Substance Use Topics   • Alcohol use: No   • Drug use: No        Review of Systems   Constitutional: Negative for chills, fatigue and fever  HENT: Positive for facial swelling  Negative for congestion, rhinorrhea and sore throat  Eyes: Positive for redness   Negative for pain, discharge and visual disturbance  Respiratory: Negative for cough, chest tightness, shortness of breath and wheezing  Cardiovascular: Negative for chest pain and palpitations  Gastrointestinal: Negative for abdominal pain, diarrhea, nausea and vomiting  Endocrine: Negative  Genitourinary: Negative for difficulty urinating and hematuria  Musculoskeletal: Negative for back pain and myalgias  Skin: Positive for rash  Negative for pallor  Allergic/Immunologic: Negative  Neurological: Negative for dizziness, weakness, light-headedness and headaches  Hematological: Negative  Physical Exam  ED Triage Vitals   Temperature Pulse Respirations Blood Pressure SpO2   04/28/23 1101 04/28/23 1101 04/28/23 1101 04/28/23 1101 04/28/23 1101   98 3 °F (36 8 °C) 83 16 169/96 99 %      Temp Source Heart Rate Source Patient Position - Orthostatic VS BP Location FiO2 (%)   04/28/23 1101 04/28/23 1418 04/28/23 1418 04/28/23 1418 --   Oral Monitor Lying Left arm       Pain Score       04/28/23 1101       No Pain             Orthostatic Vital Signs  Vitals:    04/28/23 1823 04/28/23 2258 04/29/23 0728 04/29/23 1509   BP: 151/99 150/98 (!) 157/105 163/94   Pulse: 75 73 65 78   Patient Position - Orthostatic VS:   Lying        Physical Exam  Vitals and nursing note reviewed  Constitutional:       General: She is not in acute distress  Appearance: Normal appearance  She is not ill-appearing  HENT:      Head: Normocephalic and atraumatic  Comments: Patient has swelling from under her left eyelid down to the mandible  No overlying erythema  Nontender  Right Ear: Tympanic membrane, ear canal and external ear normal       Left Ear: Tympanic membrane, ear canal and external ear normal       Mouth/Throat:      Comments: Slight erythema in the pharynx  No tonsillar exudates  Tonsils are both enlarged, about 2+  Floor the mouth is soft and nontender  No intraoral swelling    Eyes:      Conjunctiva/sclera: Conjunctivae normal    Cardiovascular:      Rate and Rhythm: Normal rate and regular rhythm  Pulmonary:      Effort: Pulmonary effort is normal  No respiratory distress  Abdominal:      General: Abdomen is flat  Palpations: Abdomen is soft  Musculoskeletal:         General: Normal range of motion  Cervical back: Normal range of motion and neck supple  Skin:     General: Skin is warm and dry  Comments: Patient has vesicular eruption under her nose on the left side of her face  She has 1 vesicular eruption on the tip of her nose as well  Neurological:      General: No focal deficit present  Mental Status: She is alert and oriented to person, place, and time           ED Medications  Medications   fluorescein sodium sterile ophthalmic strip 1 strip (1 strip Left Eye Given by Other 4/28/23 1157)   tetracaine 0 5 % ophthalmic solution 2 drop (2 drops Left Eye Given by Other 4/28/23 1157)   ampicillin-sulbactam (UNASYN) 3 g in sodium chloride 0 9 % 100 mL IVPB (0 g Intravenous Stopped 4/28/23 1515)   potassium chloride (K-DUR,KLOR-CON) CR tablet 40 mEq (40 mEq Oral Given 4/28/23 1559)   iohexol (OMNIPAQUE) 350 MG/ML injection (SINGLE-DOSE) 100 mL (100 mL Intravenous Given 4/28/23 1547)   potassium chloride oral solution 40 mEq (40 mEq Oral Given 4/28/23 1831)       Diagnostic Studies  Results Reviewed     Procedure Component Value Units Date/Time    Urine Microscopic [[id number]]  (Abnormal) Collected: 04/28/23 1321    Lab Status: Final result Specimen: Urine, Clean Catch Updated: 04/28/23 1331     RBC, UA 2-4 /hpf      WBC, UA 4-10 /hpf      Epithelial Cells Occasional /hpf      Bacteria, UA None Seen /hpf      MUCUS THREADS Occasional    UA w Reflex to Microscopic w Reflex to Culture [[id number]]  (Abnormal) Collected: 04/28/23 1321    Lab Status: Final result Specimen: Urine, Clean Catch Updated: 04/28/23 1329     Color, UA Light Yellow     Clarity, UA Clear     Specific Gravity, UA 1 014     pH, UA 6 0 Leukocytes, UA Large     Nitrite, UA Negative     Protein, UA Trace mg/dl      Glucose, UA Negative mg/dl      Ketones, UA Negative mg/dl      Urobilinogen, UA <2 0 mg/dl      Bilirubin, UA Negative     Occult Blood, UA Negative    Comprehensive metabolic panel [[id number]]  (Abnormal) Collected: 04/28/23 1153    Lab Status: Final result Specimen: Blood from Arm, Right Updated: 04/28/23 1301     Sodium 138 mmol/L      Potassium 3 1 mmol/L      Chloride 103 mmol/L      CO2 32 mmol/L      ANION GAP 3 mmol/L      BUN 8 mg/dL      Creatinine 0 85 mg/dL      Glucose 143 mg/dL      Calcium 9 9 mg/dL      AST 21 U/L      ALT 33 U/L      Alkaline Phosphatase 94 U/L      Total Protein 8 0 g/dL      Albumin 3 7 g/dL      Total Bilirubin 0 25 mg/dL      eGFR 73 ml/min/1 73sq m     Narrative:      [facility] guidelines for Chronic Kidney Disease (CKD):   •  Stage 1 with normal or high GFR (GFR > 90 mL/min/1 73 square meters)  •  Stage 2 Mild CKD (GFR = 60-89 mL/min/1 73 square meters)  •  Stage 3A Moderate CKD (GFR = 45-59 mL/min/1 73 square meters)  •  Stage 3B Moderate CKD (GFR = 30-44 mL/min/1 73 square meters)  •  Stage 4 Severe CKD (GFR = 15-29 mL/min/1 73 square meters)  •  Stage 5 End Stage CKD (GFR <15 mL/min/1 73 square meters)  Note: GFR calculation is accurate only with a steady state creatinine    Strep A PCR [[id number]]  (Abnormal) Collected: 04/28/23 1153    Lab Status: Final result Specimen: Throat Updated: 04/28/23 1224     STREP A PCR Detected    CBC and differential [[id number]]  (Abnormal) Collected: 04/28/23 1153    Lab Status: Final result Specimen: Blood from Arm, Right Updated: 04/28/23 1200     WBC 7 00 Thousand/uL      RBC 5 48 Million/uL      Hemoglobin 14 0 g/dL      Hematocrit 44 5 %      MCV 81 fL      MCH 25 5 pg      MCHC 31 5 g/dL      RDW 15 3 %      MPV 10 7 fL      Platelets 248 Thousands/uL      nRBC 0 /100 WBCs      Neutrophils Relative 40 %      Immat GRANS % 0 % Lymphocytes Relative 49 %      Monocytes Relative 9 %      Eosinophils Relative 1 %      Basophils Relative 1 %      Neutrophils Absolute 2 78 Thousands/µL      Immature Grans Absolute 0 03 Thousand/uL      Lymphocytes Absolute 3 39 Thousands/µL      Monocytes Absolute 0 65 Thousand/µL      Eosinophils Absolute 0 09 Thousand/µL      Basophils Absolute 0 06 Thousands/µL                  CT soft tissue neck with contrast   Final Result by Gene Vella Fabry, DO (04/28 1622)      Prominent left greater than right palatine tonsils and slight prominence of the lingual tonsils  There is a mildly enlarged lymph node, left level 2A distribution along the anterior margin of the sternocleidomastoid muscle and posterior to the    submandibular gland  Findings are similar to prior study from 2017 suggestive of a similar infectious/inflammatory tonsillitis  Recommend follow-up imaging if symptoms persist       Workstation performed: [id number]               Procedures  Procedures      ED Course  ED Course as of 04/30/23 1854 Fri Apr 28, 2023   1537 Potassium(!): 3 1  Repleted    [id number] texted for admission                                       Medical Decision Making  20-year-old female presents with left-sided facial swelling and a vesicular rash  The rash seems concerning for herpes  Patient has a positive Bullard sign  Patient will need antivirals for treatment  We will stain the underlying to rule out dendrites on the cornea  The rash does not track inside the ear  Patient recently had a sore throat and now has left-sided facial swelling  We will check for strep to see if this is the cause of the swelling  We will get a CT of the neck to further evaluate the swelling  We will check CBC, CMP, sed rate, and CRP  Fluorescein staining was done in the eye  Patient had no dendrites, ulcers, or corneal abrasions  Patient strep test was positive  She was started on IV Rocephin    Discussed admission for airway management  Patient was started on ganciclovir for herpes  Discussed case with ophthalmology and Dr Debra Wilder recommended TobraDex drops  Case was discussed with medicine and patient was admitted to family medicine for further management  Facial swelling: acute illness or injury that poses a threat to life or bodily functions  Herpes zoster: acute illness or injury that poses a threat to life or bodily functions  Bullard's sign: acute illness or injury that poses a threat to life or bodily functions  Strep pharyngitis: acute illness or injury  Amount and/or Complexity of Data Reviewed  External Data Reviewed:      Details: Reviewed past medical history and medications  Labs: ordered  Decision-making details documented in ED Course  Radiology: ordered  Risk  Prescription drug management  Decision regarding hospitalization  Disposition  Final diagnoses:   Strep pharyngitis   Facial swelling   Herpes zoster   Bullard's sign     Time reflects when diagnosis was documented in both MDM as applicable and the Disposition within this note     Time User Action Codes Description Comment    4/28/2023  5:41 PM Jay Be Dvorište Add [J02 0] Strep pharyngitis     4/28/2023  5:41 PM Jay Be Dvorište Add [R22 0] Facial swelling     4/28/2023  5:41 PM HaJay latif Dvorište Add [B02 9] Herpes zoster     4/28/2023  5:41 PM Kimberly Chavez Add [R68 89] Bullard's sign     4/29/2023  3:24 PM Emmanuel Segal Add [J02 0] Pharyngitis due to group A beta hemolytic Streptococci     4/29/2023  3:24 PM Emmanuel Segal Add [B02 30] Herpes zoster ophthalmicus of left eye       ED Disposition     ED Disposition   Admit    Condition   Stable    Date/Time   Fri Apr 28, 2023  5:40 PM    Comment   Case was discussed with Dr Keyana Stauffer and the patient's admission status was agreed to be Admission Status: inpatient status to the service of Dr Jack Woody              Follow-up Information     Follow up With Specialties Details Why Contact Info    Lola Vann MD Ophthalmology Schedule an appointment as soon as possible for a visit in 1 week(s)  Joao Lopez   [address] [address]      [address]  [facility] Schedule an appointment as soon as possible for a visit  [address]  [address] [facility] [address] [facility]            Discharge Medication List as of 4/29/2023  3:54 PM      START taking these medications    Details   amoxicillin (AMOXIL) 500 mg capsule Take 2 capsules (1,000 mg total) by mouth every 24 hours for 9 doses, Starting Sat 4/29/2023, Until Mon 5/8/2023, Normal      tobramycin-dexamethasone (TOBRADEX) ophthalmic ointment Administer 0 5 inches into the left eye 3 (three) times a day, Starting Sat 4/29/2023, Normal      valACYclovir (VALTREX) 1,000 mg tablet Take 1 tablet (1,000 mg total) by mouth every 8 (eight) hours for 18 doses, Starting Sat 4/29/2023, Until Fri 5/5/2023, Normal         CONTINUE these medications which have NOT CHANGED    Details   allopurinol (ZYLOPRIM) 300 mg tablet Take 1 tablet (300 mg total) by mouth daily, Starting Tue 9/18/2018, Normal      amLODIPine (NORVASC) 5 mg tablet take 1 tablet by mouth once daily, Normal      aspirin (ECOTRIN LOW STRENGTH) 81 mg EC tablet Take 81 mg by mouth daily Last dose 5/26/22, Historical Med      atorvastatin (LIPITOR) 20 mg tablet Take 1 tablet (20 mg total) by mouth daily, Starting Wed 2/23/2022, Normal      Blood Pressure Monitoring (SPHYGMOMANOMETER) MISC by Does not apply route daily, Starting Fri 8/24/2018, Normal      Cholecalciferol (Vitamin D3) 50 MCG (2000 UT) TABS Take 2,000 Units by mouth daily  , Historical Med      hydrochlorothiazide (HYDRODIURIL) 25 mg tablet take 1 tablet by mouth once daily, Normal      ibuprofen (MOTRIN) 400 mg tablet Take 400 mg by mouth, Historical Med      meloxicam (MOBIC) 15 mg tablet Take 1 tablet by mouth daily as needed, Starting Mon 9/12/2022, Historical Med      metFORMIN (GLUCOPHAGE-XR) 500 mg 24 hr tablet Take 1 tablet (500 mg total) by mouth daily with dinner, Starting Wed 2/23/2022, Normal      Multiple Vitamins-Minerals (multivitamin with minerals) tablet Take 1 tablet by mouth daily Women's 50 plus daily, Starting Wed 2/23/2022, Normal      pantoprazole (PROTONIX) 40 mg tablet Take 1 tablet (40 mg total) by mouth daily Take pantoprazole twice a day for 2 weeks and then once a day, Starting Tue 4/5/2022, Normal           No discharge procedures on file  PDMP Review     None           ED Provider  Attending physically available and evaluated Darlene Chávez I managed the patient along with the ED Attending      Electronically Signed by         Zoie Talbot DO  04/30/23 0241

## 2023-04-28 NOTE — LETTER
[address] [address]  Dept: [phone]    April 29, 2023     Patient: Amalia Ghotra   YOB: 1960   Date of Visit: 4/28/2023       To Whom it May Concern:    Amalia Ghotra is under my professional care  She was seen in the hospital from 4/28/2023 to 04/29/23  She may return to work on 5/8/23 without limitations  If you have any questions or concerns, please don't hesitate to call           Sincerely,          Cameron Segal,

## 2023-04-28 NOTE — LETTER
[address] [address]  [address] [address]  Dept: [phone]    April 29, 2023     Patient: Weldon Cushing   YOB: 1960   Date of Visit: 4/28/2023       To Whom it May Concern:    Weldon Cushing is under my professional care  She was seen in the hospital from 4/28/2023 to 04/29/23  She may return to school on 5/8/23 without limitations  If you have any questions or concerns, please don't hesitate to call           Sincerely,          Cady Segal,

## 2023-04-28 NOTE — ASSESSMENT & PLAN NOTE
Patient presenting with one day of left facial swelling and ocular pruritis in context of ~5 days of sore throat/strep pharyngitis  Also with new eruption of burning vesicular lesions on left nose and left upper lift consistent with herpes zoster infection  Patient appears uncomfortable with pruritis of eye however otherwise stable  Received 1x dose tetracaine eyedrops in ED     · Continue with Ganciclovir 310mg Q12  · If patient improving by tomorrow consider transition to Oral Valacyclovir 1000mg Q8h for 7 days  · Start Tobradex eye drops TID in left eye  · Monitor for progression of swelling/lesions  · IP consult to opthamology for fundoscopic exam, appreciate recommendations

## 2023-04-28 NOTE — ASSESSMENT & PLAN NOTE
Still hypertensive this AM  Continue home medications    · Norvasc 5mg daily QD  · Hydrochlorothiazide 25mg QD  · Restart home meds  · Asymptomatic

## 2023-04-28 NOTE — ASSESSMENT & PLAN NOTE
Patient with admission K+ of 3 1, received 40meq KChlor in ED  Has had reduced PO intake over the past few days, takes home HCTZ    · Monitor AM BMP - K+ > 4 0, no need for additional K+ supplementation

## 2023-04-29 VITALS
TEMPERATURE: 98.4 F | SYSTOLIC BLOOD PRESSURE: 163 MMHG | DIASTOLIC BLOOD PRESSURE: 94 MMHG | RESPIRATION RATE: 20 BRPM | HEART RATE: 78 BPM | OXYGEN SATURATION: 97 %

## 2023-04-29 LAB
ALBUMIN SERPL BCP-MCNC: 3.2 G/DL (ref 3.5–5)
ALP SERPL-CCNC: 85 U/L (ref 46–116)
ALT SERPL W P-5'-P-CCNC: 27 U/L (ref 12–78)
ANION GAP SERPL CALCULATED.3IONS-SCNC: 0 MMOL/L (ref 4–13)
AST SERPL W P-5'-P-CCNC: 13 U/L (ref 5–45)
B-HEM STREP SPEC QL CULT: NEGATIVE
BASOPHILS # BLD AUTO: 0.05 THOUSANDS/ÂΜL (ref 0–0.1)
BASOPHILS NFR BLD AUTO: 1 % (ref 0–1)
BILIRUB SERPL-MCNC: 0.27 MG/DL (ref 0.2–1)
BUN SERPL-MCNC: 6 MG/DL (ref 5–25)
CALCIUM ALBUM COR SERPL-MCNC: 9.7 MG/DL (ref 8.3–10.1)
CALCIUM SERPL-MCNC: 9.1 MG/DL (ref 8.3–10.1)
CHLORIDE SERPL-SCNC: 110 MMOL/L (ref 96–108)
CO2 SERPL-SCNC: 30 MMOL/L (ref 21–32)
CREAT SERPL-MCNC: 0.87 MG/DL (ref 0.6–1.3)
EOSINOPHIL # BLD AUTO: 0.11 THOUSAND/ÂΜL (ref 0–0.61)
EOSINOPHIL NFR BLD AUTO: 2 % (ref 0–6)
ERYTHROCYTE [DISTWIDTH] IN BLOOD BY AUTOMATED COUNT: 15.2 % (ref 11.6–15.1)
GFR SERPL CREATININE-BSD FRML MDRD: 71 ML/MIN/1.73SQ M
GLUCOSE SERPL-MCNC: 117 MG/DL (ref 65–140)
GLUCOSE SERPL-MCNC: 135 MG/DL (ref 65–140)
GLUCOSE SERPL-MCNC: 152 MG/DL (ref 65–140)
HCT VFR BLD AUTO: 42.7 % (ref 34.8–46.1)
HGB BLD-MCNC: 13.2 G/DL (ref 11.5–15.4)
IMM GRANULOCYTES # BLD AUTO: 0.03 THOUSAND/UL (ref 0–0.2)
IMM GRANULOCYTES NFR BLD AUTO: 1 % (ref 0–2)
LYMPHOCYTES # BLD AUTO: 2.86 THOUSANDS/ÂΜL (ref 0.6–4.47)
LYMPHOCYTES NFR BLD AUTO: 48 % (ref 14–44)
MAGNESIUM SERPL-MCNC: 2.5 MG/DL (ref 1.6–2.6)
MCH RBC QN AUTO: 25.5 PG (ref 26.8–34.3)
MCHC RBC AUTO-ENTMCNC: 30.9 G/DL (ref 31.4–37.4)
MCV RBC AUTO: 82 FL (ref 82–98)
MONOCYTES # BLD AUTO: 0.56 THOUSAND/ÂΜL (ref 0.17–1.22)
MONOCYTES NFR BLD AUTO: 9 % (ref 4–12)
NEUTROPHILS # BLD AUTO: 2.34 THOUSANDS/ÂΜL (ref 1.85–7.62)
NEUTS SEG NFR BLD AUTO: 39 % (ref 43–75)
NRBC BLD AUTO-RTO: 0 /100 WBCS
PLATELET # BLD AUTO: 238 THOUSANDS/UL (ref 149–390)
PMV BLD AUTO: 10.6 FL (ref 8.9–12.7)
POTASSIUM SERPL-SCNC: 4 MMOL/L (ref 3.5–5.3)
PROT SERPL-MCNC: 7 G/DL (ref 6.4–8.4)
RBC # BLD AUTO: 5.18 MILLION/UL (ref 3.81–5.12)
SODIUM SERPL-SCNC: 140 MMOL/L (ref 135–147)
WBC # BLD AUTO: 5.95 THOUSAND/UL (ref 4.31–10.16)

## 2023-04-29 RX ORDER — VALACYCLOVIR HYDROCHLORIDE 1 G/1
1000 TABLET, FILM COATED ORAL EVERY 8 HOURS SCHEDULED
Qty: 18 TABLET | Refills: 0 | Status: SHIPPED | OUTPATIENT
Start: 2023-04-29 | End: 2023-05-05

## 2023-04-29 RX ORDER — ATORVASTATIN CALCIUM 20 MG/1
20 TABLET, FILM COATED ORAL DAILY
Status: DISCONTINUED | OUTPATIENT
Start: 2023-04-29 | End: 2023-04-29 | Stop reason: HOSPADM

## 2023-04-29 RX ORDER — PANTOPRAZOLE SODIUM 40 MG/1
40 TABLET, DELAYED RELEASE ORAL DAILY
Status: DISCONTINUED | OUTPATIENT
Start: 2023-04-29 | End: 2023-04-29 | Stop reason: HOSPADM

## 2023-04-29 RX ORDER — AMOXICILLIN 500 MG/1
1000 CAPSULE ORAL EVERY 24 HOURS
Qty: 18 CAPSULE | Refills: 0 | Status: SHIPPED | OUTPATIENT
Start: 2023-04-29 | End: 2023-04-29 | Stop reason: SDUPTHER

## 2023-04-29 RX ORDER — AMLODIPINE BESYLATE 5 MG/1
5 TABLET ORAL DAILY
Status: DISCONTINUED | OUTPATIENT
Start: 2023-04-29 | End: 2023-04-29 | Stop reason: HOSPADM

## 2023-04-29 RX ORDER — VALACYCLOVIR HYDROCHLORIDE 1 G/1
1000 TABLET, FILM COATED ORAL EVERY 8 HOURS SCHEDULED
Status: DISCONTINUED | OUTPATIENT
Start: 2023-04-29 | End: 2023-04-29 | Stop reason: HOSPADM

## 2023-04-29 RX ORDER — ASPIRIN 81 MG/1
81 TABLET ORAL DAILY
Status: DISCONTINUED | OUTPATIENT
Start: 2023-04-29 | End: 2023-04-29 | Stop reason: HOSPADM

## 2023-04-29 RX ORDER — HYDROCHLOROTHIAZIDE 25 MG/1
25 TABLET ORAL DAILY
Status: DISCONTINUED | OUTPATIENT
Start: 2023-04-29 | End: 2023-04-29 | Stop reason: HOSPADM

## 2023-04-29 RX ORDER — AMOXICILLIN 500 MG/1
1000 CAPSULE ORAL EVERY 24 HOURS
Qty: 18 CAPSULE | Refills: 0 | Status: SHIPPED | OUTPATIENT
Start: 2023-04-29 | End: 2023-05-08

## 2023-04-29 RX ORDER — VALACYCLOVIR HYDROCHLORIDE 1 G/1
1000 TABLET, FILM COATED ORAL EVERY 8 HOURS SCHEDULED
Qty: 18 TABLET | Refills: 0 | Status: SHIPPED | OUTPATIENT
Start: 2023-04-29 | End: 2023-04-29 | Stop reason: SDUPTHER

## 2023-04-29 RX ORDER — ALLOPURINOL 300 MG/1
300 TABLET ORAL DAILY
Status: DISCONTINUED | OUTPATIENT
Start: 2023-04-29 | End: 2023-04-29 | Stop reason: HOSPADM

## 2023-04-29 RX ORDER — MELATONIN
2000 DAILY
Status: DISCONTINUED | OUTPATIENT
Start: 2023-04-29 | End: 2023-04-29 | Stop reason: HOSPADM

## 2023-04-29 RX ADMIN — ATORVASTATIN CALCIUM 20 MG: 20 TABLET, FILM COATED ORAL at 08:32

## 2023-04-29 RX ADMIN — AMOXICILLIN 1000 MG: 500 CAPSULE ORAL at 15:59

## 2023-04-29 RX ADMIN — HYDROCHLOROTHIAZIDE 25 MG: 25 TABLET ORAL at 08:32

## 2023-04-29 RX ADMIN — TOBRAMYCIN AND DEXAMETHASONE 0.5 INCH: 3; 1 OINTMENT OPHTHALMIC at 08:32

## 2023-04-29 RX ADMIN — ASPIRIN 81 MG: 81 TABLET, COATED ORAL at 08:32

## 2023-04-29 RX ADMIN — PANTOPRAZOLE SODIUM 40 MG: 40 TABLET, DELAYED RELEASE ORAL at 08:32

## 2023-04-29 RX ADMIN — GANCICLOVIR SODIUM 310 MG: 500 INJECTION, POWDER, LYOPHILIZED, FOR SOLUTION INTRAVENOUS at 08:35

## 2023-04-29 RX ADMIN — ALLOPURINOL 300 MG: 300 TABLET ORAL at 08:32

## 2023-04-29 RX ADMIN — AMLODIPINE BESYLATE 5 MG: 5 TABLET ORAL at 08:32

## 2023-04-29 NOTE — QUICK NOTE
Confirmed with nursing that pt will be discharged  Amoxicillin will be administered prior to discharge      Mario Pond, DO PGY-2  Family Medicine

## 2023-04-29 NOTE — DISCHARGE SUMMARY
"Discharge Summary - Macrina Bustos 58 y o  female MRN: [id number]    Unit/Bed#: CW2 213-01 Encounter: [id number]    Admission Date: 4/28/2023   Discharge Date: 4/29/2023    Diagnosis: Herpes zoster [B02 9]  Facial swelling [R22 0]  Strep pharyngitis [J02 0]  Bullard's sign [R68 89]    Important Physician Related Follow Up:   · Ophthalmology - 1 week  · PCP - within 2 weeks     Procedures Performed: No orders of the defined types were placed in this encounter  Hospital Course:    Per admitting physician, \"Patient presents to the hospital for evaluation of left facial swelling in the context of 5 days of sore throat, fevers, chills, and sweats  Patient first noticed symptoms on Sunday while at work when she began to feel unwell with fevers, headaches and sore throat  She was sent home and nursed her symptoms with rest, motrin and herbal teas  Not associated with loose stools or urinary symptoms, shortness of breath, or chest pains  Symptoms began to improve over the next few days however sore throat remained, so she went to her primary doctor on the 26th who suspected URI given improvement of symptoms and sent her home  Also noted vesicular eruptions of upper lip  However, this AM patient had increased swelling of the left side of her face with vesicular eruption on the nose and conjunctival injection, and she was brought to the emergency department for further evaluation  \"    Patient was admitted to the hospital floor  Patient was monitored, evaluated by specialist, and medically cleared for discharge  Patient will be discharged with remaining treatment for 10 total days of amoxicillin, 7 days total of treatment of herpes zoster ophthalmicus with Valtrex, TobraDex drops  Pharmacy confirmed  On day of discharge, pt was clinically improved  Plan was discussed with pt, and pt was agreeable  Pt will be discharged to home      Complications: None    Exam on Day of Discharge:   Please see physical exam from " date of discharge  Condition at Discharge: good     Discharge instructions/Information to patient and family:   See after visit summary for information provided to patient and family  Provisions for Follow-Up Care:  See after visit summary for information related to follow-up care and any pertinent home health orders  Disposition: Home    Planned Readmission: No    Discharge Statement   I spent 30 minutes discharging the patient  This time was spent on the day of discharge  I had direct contact with the patient on the day of discharge  Additional documentation is required if more than 30 minutes were spent on discharge  Discharge Medications:  See after visit summary for reconciled discharge medications provided to patient and family    Medication changes made with this admission:  · Please see AVS summary    Cameron Segal DO  [facility] Family Medicine PGY2  4/29/2023  3:26 PM

## 2023-04-29 NOTE — UTILIZATION REVIEW
Initial Clinical Review    Admission: Date/Time/Statement:   Admission Orders (From admission, onward)     Ordered        04/28/23 2515  Place in Observation  Once                      Orders Placed This Encounter   Procedures   • Place in Observation     Standing Status:   Standing     Number of Occurrences:   1     Order Specific Question:   Level of Care     Answer:   Med Surg [16]     ED Arrival Information     Expected   -    Arrival   4/28/2023 10:58    Acuity   Urgent            Means of arrival   Walk-In    Escorted by   Self    Service   Family Medicine    Admission type   Emergency            Arrival complaint   swollen face           Chief Complaint   Patient presents with   • Facial Swelling     Woke up with left sided facial swelling       Initial Presentation: 58 y o  female to ED from home admitted observation d/t Herpes zoster ophthalmicus of left eye  * Herpes zoster ophthalmicus of left eye  Assessment & Plan  Patient presenting with one day of left facial swelling and ocular pruritis in context of ~5 days of sore throat/strep pharyngitis  Also with new eruption of burning vesicular lesions on left nose and left upper lift consistent with herpes zoster infection  Patient appears uncomfortable with pruritis of eye however otherwise stable  Received 1x dose tetracaine eyedrops in ED  • Continue with Ganciclovir 310mg Q12  ? If patient improving by tomorrow consider transition to Oral Valacyclovir 1000mg Q8h for 7 days  • Start Tobradex eye drops TID in left eye  • Monitor for progression of swelling/lesions  • IP consult to opthamology for fundoscopic exam, appreciate recommendations     Pharyngitis due to group A beta hemolytic Streptococci  Assessment & Plan  Patient with 5d history of sore throat, progressive dypshagia, multiple days of fever and chills which have since resolved  Was seen by primary provider 4/26 and had a positive rapid strep and today with positive Strep A PCR   Continues to endorse mild throat pain, has some difficulty swallowing and PE significant for 2+ tonsils with left tonsillar exudate  CT in ED significant for findings suggestive of infectious tonsillitis  Received 1x Dose Unasyn 3g in ED  • 4/29, Transition to amoxicillin 1000mg QD for 9 days  • Trend fever curve  • Airway precautions        Hypokalemia  Assessment & Plan  Patient with admission K+ of 3 1, received Love Olayinka in ED  Has had reduced PO intake over the past few days, takes home HCTZ  • One additional dose of Kchlor 40 now  • Monitor AM BMP     Gout  Assessment & Plan  Continue PTA medications  • Allopurinol 300mg QD     Hyperlipidemia  Assessment & Plan  Last know lipid panel 3/13/23 Showing cholesterol 201, Triglyceride 231, HDL 43   • Continue Home Atorvastatin 20mg      Type 2 diabetes mellitus without complication, without long-term current use of insulin (HCC)  Assessment & Plan        Lab Results   Component Value Date     HGBA1C 6 4 (H) 03/13/2023         No results for input(s): POCGLU in the last 72 hours      Blood Sugar Average: Last 72 hrs:  Most recent A1c well controlled at 6 4, on home metformin 500mg QD  Hold home medications  • Sliding scale Algorithm 3 while admitted     Hypertension  Assessment & Plan  Blood pressure on admission 156/94  Continue home medications    • Norvasc 5mg daily QD  • Hydrochlorothiazide 25mg QD                ED Triage Vitals   Temperature Pulse Respirations Blood Pressure SpO2   04/28/23 1101 04/28/23 1101 04/28/23 1101 04/28/23 1101 04/28/23 1101   98 3 °F (36 8 °C) 83 16 169/96 99 %      Temp Source Heart Rate Source Patient Position - Orthostatic VS BP Location FiO2 (%)   04/28/23 1101 04/28/23 1418 04/28/23 1418 04/28/23 1418 --   Oral Monitor Lying Left arm       Pain Score       04/28/23 1101       No Pain          Wt Readings from Last 1 Encounters:   04/26/23 92 kg (202 lb 14 4 oz)     Additional Vital Signs:   04/29/23 07:28:38 97 9 °F (36 6 °C) 65 20 157/105 Abnormal  122 97 % None (Room air) Lying   04/28/23 22:58:36 98 °F (36 7 °C) 73 -- 150/98 115 97 % -- --   04/28/23 18:23:47 98 1 °F (36 7 °C) 75 20 151/99 116 98 % -- --   04/28/23 1418 -- 77 -- 156/94 -- 99 % None (Room air) Lying   04/28/23 1200 -- 74 -- -- -- 97 % None (Room air) --   04/28/23 1140 -- -- -- -- -- -- None (Room air) --   04/28/23 1101 98 3 °F (36 8 °C) 83 16 169/96 -- 99 % None (Room air) --       Pertinent Labs/Diagnostic Test Results:   CT soft tissue neck with contrast   Final Result by Anant Diaz DO (04/28 1622)      Prominent left greater than right palatine tonsils and slight prominence of the lingual tonsils  There is a mildly enlarged lymph node, left level 2A distribution along the anterior margin of the sternocleidomastoid muscle and posterior to the    submandibular gland  Findings are similar to prior study from 2017 suggestive of a similar infectious/inflammatory tonsillitis   Recommend follow-up imaging if symptoms persist       Workstation performed: [id number]               Results from last 7 days   Lab Units 04/29/23  0542 04/28/23  1153   WBC Thousand/uL 5 95 7 00   HEMOGLOBIN g/dL 13 2 14 0   HEMATOCRIT % 42 7 44 5   PLATELETS Thousands/uL 238 259   NEUTROS ABS Thousands/µL 2 34 2 78         Results from last 7 days   Lab Units 04/29/23  0542 04/28/23  1153   SODIUM mmol/L 140 138   POTASSIUM mmol/L 4 0 3 1*   CHLORIDE mmol/L 110* 103   CO2 mmol/L 30 32   ANION GAP mmol/L 0* 3*   BUN mg/dL 6 8   CREATININE mg/dL 0 87 0 85   EGFR ml/min/1 73sq m 71 73   CALCIUM mg/dL 9 1 9 9   MAGNESIUM mg/dL 2 5  --      Results from last 7 days   Lab Units 04/29/23  0542 04/28/23  1153   AST U/L 13 21   ALT U/L 27 33   ALK PHOS U/L 85 94   TOTAL PROTEIN g/dL 7 0 8 0   ALBUMIN g/dL 3 2* 3 7   TOTAL BILIRUBIN mg/dL 0 27 0 25     Results from last 7 days   Lab Units 04/29/23  0544 04/28/23  1835   POC GLUCOSE mg/dl 117 142*     Results from last 7 days   Lab Units 04/29/23  0542 04/28/23  1153   GLUCOSE RANDOM mg/dL 135 143*       Results from last 7 days   Lab Units 04/28/23  1321   CLARITY UA  Clear   COLOR UA  Light Yellow   SPEC GRAV UA  1 014   PH UA  6 0   GLUCOSE UA mg/dl Negative   KETONES UA mg/dl Negative   BLOOD UA  Negative   PROTEIN UA mg/dl Trace*   NITRITE UA  Negative   BILIRUBIN UA  Negative   UROBILINOGEN UA (BE) mg/dl <2 0   LEUKOCYTES UA  Large*   WBC UA /hpf 4-10*   RBC UA /hpf 2-4*   BACTERIA UA /hpf None Seen   EPITHELIAL CELLS WET PREP /hpf Occasional   MUCUS THREADS  Occasional*       ED Treatment:   Medication Administration from 04/28/2023 1058 to 04/28/2023 1811       Date/Time Order Dose Route Action       04/28/2023 1157 EDT fluorescein sodium sterile ophthalmic strip 1 strip 1 strip Left Eye Given by Other       04/28/2023 1157 EDT tetracaine 0 5 % ophthalmic solution 2 drop 2 drop Left Eye Given by Other                  04/28/2023 1426 EDT ampicillin-sulbactam (UNASYN) 3 g in sodium chloride 0 9 % 100 mL IVPB 3 g Intravenous New Bag       04/28/2023 1559 EDT potassium chloride (K-DUR,KLOR-CON) CR tablet 40 mEq 40 mEq Oral Given                     Past Medical History:   Diagnosis Date   • Arthritis    • Diabetes mellitus ([address] )    • Gout    • Hyperlipidemia    • Hypertension      Present on Admission:  • Hyperlipidemia  • Hypertension  • Type 2 diabetes mellitus without complication, without long-term current use of insulin (HCC)      Admitting Diagnosis: Herpes zoster [B02 9]  Facial swelling [R22 0]  Strep pharyngitis [J02 0]  Bullard's sign [R68 89]  Age/Sex: 58 y o  female  Admission Orders:  Scheduled Medications:  allopurinol, 300 mg, Oral, Daily  amLODIPine, 5 mg, Oral, Daily  amoxicillin, 1,000 mg, Oral, Q24H  aspirin, 81 mg, Oral, Daily  atorvastatin, 20 mg, Oral, Daily  cholecalciferol, 2,000 Units, Oral, Daily  enoxaparin, 40 mg, Subcutaneous, Daily  ganciclovir, 5 mg/kg (Ideal), Intravenous, Q12H  hydrochlorothiazide, 25 mg, Oral, Daily  insulin lispro, 1-6 Units, Subcutaneous, TID AC  pantoprazole, 40 mg, Oral, Daily  tobramycin-dexamethasone, 0 5 inch, Left Eye, TID      PRN Meds:  acetaminophen, 650 mg, Oral, Q6H PRN    Reg diet  scd  oob      IP CONSULT TO OPHTHALMOLOGY    Network Utilization Review Department  ATTENTION: Please call with any questions or concerns to [phone] and carefully listen to the prompts so that you are directed to the right person  All voicemails are confidential   Sean Adams all requests for admission clinical reviews, approved or denied determinations and any other requests to dedicated fax number below belonging to the campus where the patient is receiving treatment   List of dedicated fax numbers for the Facilities:  [address] DENIALS (Administrative/Medical Necessity) [phone]   [address] (Maternity/NICU/Pediatrics) [phone]   917 Marga Pugh [phone]   [facility] [address] [phone]   [address] [place] [address] [facility] [phone]   [address] [facility] 815 [facility] [phone]

## 2023-04-29 NOTE — CONSULTS
This 51-year-old woman presented to the emergency room and was diagnosed with zoster including Bullard sign on the left side  She was started on TobraDex drops  Today she complains of tenderness around the left eye  On examination, visual acuity is 20/200 in the left eye  Vision in the right eye is 20/100     Pupils are equal round and reactive to light with no afferent defect  Extraocular movements are unrestricted  The external examination of the left eye reveals mild conjunctival injection  There is no corneal staining  The intraocular pressure is 16  Impression: Left herpes zoster ophthalmicus  Mild involvement of the left eye  Plan: Continue TobraDex drops 3 times daily for 5 days  The patient stated she will follow-up with me in my office after discharge next week

## 2023-04-29 NOTE — PROGRESS NOTES
[facility]  Progress Note  Name: Matt Loco  MRN: [id number]  Unit/Bed#: WE2 213-01 I Date of Admission: 4/28/2023   Date of Service: 4/29/2023 I Hospital Day: 0    Assessment/Plan   Pharyngitis due to group A beta hemolytic Streptococci  Assessment & Plan  Patient with 5d history of sore throat, progressive dypshagia, multiple days of fever and chills which have since resolved  Was seen by primary provider 4/26 and had a positive rapid strep and today with positive Strep A PCR  Continues to endorse mild throat pain, has some difficulty swallowing and PE significant for 2+ tonsils with left tonsillar exudate  CT in ED significant for findings suggestive of infectious tonsillitis  Received 1x Dose Unasyn 3g in ED  · 4/29, Transition to amoxicillin 1000mg QD for 9 days  · Trend fever curve  · Airway precautions      Hypokalemia  Assessment & Plan  Patient with admission K+ of 3 1, received 40meq KChlor in ED  Has had reduced PO intake over the past few days, takes home HCTZ  · Monitor AM BMP - K+ > 4 0, no need for additional K+ supplementation    Gout  Assessment & Plan  Continue PTA medications  · Allopurinol 300mg QD     Hyperlipidemia  Assessment & Plan  Last know lipid panel 3/13/23 Showing cholesterol 201, Triglyceride 231, HDL 43   · Continue Home Atorvastatin 20mg      Type 2 diabetes mellitus without complication, without long-term current use of insulin [facility])  Assessment & Plan  Lab Results   Component Value Date    HGBA1C 6 4 (H) 03/13/2023       Recent Labs     04/28/23  1835 04/29/23  0544   POCGLU 142* 117       Blood Sugar Average: Last 72 hrs:  Most recent A1c well controlled at 6 4, on home metformin 500mg QD  Hold home medications  · Sliding scale Algorithm 3 while admitted     Hypertension  Assessment & Plan  Still hypertensive this AM  Continue home medications    · Norvasc 5mg daily QD  · Hydrochlorothiazide 25mg QD  · Restart home meds  · Asymptomatic    * Herpes zoster ophthalmicus of left eye  Assessment & Plan  Patient presenting with one day of left facial swelling and ocular pruritis in context of ~5 days of sore throat/strep pharyngitis  Also with new eruption of burning vesicular lesions on left nose and left upper lift consistent with herpes zoster infection  Patient appears uncomfortable with pruritis of eye however otherwise stable  Received 1x dose tetracaine eyedrops in ED  · Continue with Ganciclovir 310mg Q12  · If patient improving by tomorrow consider transition to Oral Valacyclovir 1000mg Q8h for 7 days  · Start Tobradex eye drops TID in left eye  · Monitor for progression of swelling/lesions  · IP consult to opthamology for fundoscopic exam, appreciate recommendations            Subjective:   Patient seen and examined at bedside this AM  No issues overnight  Reports normal vision in the left eye, no pain, just some intermittent itching  Throat pain improved, no problems swallowing  Objective:     Vitals: Blood pressure (!) 157/105, pulse 65, temperature 97 9 °F (36 6 °C), resp  rate 20, SpO2 97 %, not currently breastfeeding  ,There is no height or weight on file to calculate BMI  No intake or output data in the 24 hours ending 04/29/23 0810    Physical Exam:   Physical Exam  Vitals reviewed  Constitutional:       General: She is not in acute distress  Appearance: Normal appearance  She is not ill-appearing  HENT:      Head: Normocephalic  Nose: Nose normal  No congestion  Mouth/Throat:      Mouth: Mucous membranes are moist       Pharynx: Posterior oropharyngeal erythema present  No oropharyngeal exudate  Eyes:      Extraocular Movements: Extraocular movements intact  Pupils: Pupils are equal, round, and reactive to light  Cardiovascular:      Rate and Rhythm: Normal rate and regular rhythm  Pulses: Normal pulses  Heart sounds: Normal heart sounds     Pulmonary:      Effort: Pulmonary effort is normal       Breath sounds: Normal breath sounds  Abdominal:      General: Abdomen is flat  Palpations: Abdomen is soft  Tenderness: There is no abdominal tenderness  Musculoskeletal:      Cervical back: Normal range of motion  Right lower leg: No edema  Left lower leg: No edema  Lymphadenopathy:      Cervical: Cervical adenopathy present  Skin:     General: Skin is warm and dry  Capillary Refill: Capillary refill takes less than 2 seconds  Findings: No rash  Neurological:      Mental Status: She is alert  Sensory: No sensory deficit  Motor: No weakness  Psychiatric:         Mood and Affect: Mood normal          Behavior: Behavior normal          Invasive Devices     Peripheral Intravenous Line  Duration           Peripheral IV 04/28/23 Right Antecubital <1 day                           Lab and other studies:  I have personally reviewed pertinent reports         Recent Results (from the past 24 hour(s))   CBC and differential    Collection Time: 04/28/23 11:53 AM   Result Value Ref Range    WBC 7 00 4 31 - 10 16 Thousand/uL    RBC 5 48 (H) 3 81 - 5 12 Million/uL    Hemoglobin 14 0 11 5 - 15 4 g/dL    Hematocrit 44 5 34 8 - 46 1 %    MCV 81 (L) 82 - 98 fL    MCH 25 5 (L) 26 8 - 34 3 pg    MCHC 31 5 31 4 - 37 4 g/dL    RDW 15 3 (H) 11 6 - 15 1 %    MPV 10 7 8 9 - 12 7 fL    Platelets 182 546 - 684 Thousands/uL    nRBC 0 /100 WBCs    Neutrophils Relative 40 (L) 43 - 75 %    Immat GRANS % 0 0 - 2 %    Lymphocytes Relative 49 (H) 14 - 44 %    Monocytes Relative 9 4 - 12 %    Eosinophils Relative 1 0 - 6 %    Basophils Relative 1 0 - 1 %    Neutrophils Absolute 2 78 1 85 - 7 62 Thousands/µL    Immature Grans Absolute 0 03 0 00 - 0 20 Thousand/uL    Lymphocytes Absolute 3 39 0 60 - 4 47 Thousands/µL    Monocytes Absolute 0 65 0 17 - 1 22 Thousand/µL    Eosinophils Absolute 0 09 0 00 - 0 61 Thousand/µL    Basophils Absolute 0 06 0 00 - 0 10 Thousands/µL Comprehensive metabolic panel    Collection Time: 04/28/23 11:53 AM   Result Value Ref Range    Sodium 138 135 - 147 mmol/L    Potassium 3 1 (L) 3 5 - 5 3 mmol/L    Chloride 103 96 - 108 mmol/L    CO2 32 21 - 32 mmol/L    ANION GAP 3 (L) 4 - 13 mmol/L    BUN 8 5 - 25 mg/dL    Creatinine 0 85 0 60 - 1 30 mg/dL    Glucose 143 (H) 65 - 140 mg/dL    Calcium 9 9 8 3 - 10 1 mg/dL    AST 21 5 - 45 U/L    ALT 33 12 - 78 U/L    Alkaline Phosphatase 94 46 - 116 U/L    Total Protein 8 0 6 4 - 8 4 g/dL    Albumin 3 7 3 5 - 5 0 g/dL    Total Bilirubin 0 25 0 20 - 1 00 mg/dL    eGFR 73 ml/min/1 73sq m   Strep A PCR    Collection Time: 04/28/23 11:53 AM    Specimen: Throat   Result Value Ref Range    STREP A PCR Detected (A) Not Detected   UA w Reflex to Microscopic w Reflex to Culture    Collection Time: 04/28/23  1:21 PM    Specimen: Urine, Clean Catch   Result Value Ref Range    Color, UA Light Yellow     Clarity, UA Clear     Specific [place], UA 1 014 1 003 - 1 030    pH, UA 6 0 4 5, 5 0, 5 5, 6 0, 6 5, 7 0, 7 5, 8 0    Leukocytes, UA Large (A) Negative    Nitrite, UA Negative Negative    Protein, UA Trace (A) Negative mg/dl    Glucose, UA Negative Negative mg/dl    Ketones, UA Negative Negative mg/dl    Urobilinogen, UA <2 0 <2 0 mg/dl mg/dl    Bilirubin, UA Negative Negative    Occult Blood, UA Negative Negative   Urine Microscopic    Collection Time: 04/28/23  1:21 PM   Result Value Ref Range    RBC, UA 2-4 (A) None Seen, 1-2 /hpf    WBC, UA 4-10 (A) None Seen, 1-2 /hpf    Epithelial Cells Occasional None Seen, Occasional /hpf    Bacteria, UA None Seen None Seen, Occasional /hpf    MUCUS THREADS Occasional (A) None Seen   Fingerstick Glucose (POCT)    Collection Time: 04/28/23  6:35 PM   Result Value Ref Range    POC Glucose 142 (H) 65 - 140 mg/dl   Comprehensive metabolic panel    Collection Time: 04/29/23  5:42 AM   Result Value Ref Range    Sodium 140 135 - 147 mmol/L    Potassium 4 0 3 5 - 5 3 mmol/L    Chloride 110 (H) 96 - 108 mmol/L    CO2 30 21 - 32 mmol/L    ANION GAP 0 (L) 4 - 13 mmol/L    BUN 6 5 - 25 mg/dL    Creatinine 0 87 0 60 - 1 30 mg/dL    Glucose 135 65 - 140 mg/dL    Calcium 9 1 8 3 - 10 1 mg/dL    Corrected Calcium 9 7 8 3 - 10 1 mg/dL    AST 13 5 - 45 U/L    ALT 27 12 - 78 U/L    Alkaline Phosphatase 85 46 - 116 U/L    Total Protein 7 0 6 4 - 8 4 g/dL    Albumin 3 2 (L) 3 5 - 5 0 g/dL    Total Bilirubin 0 27 0 20 - 1 00 mg/dL    eGFR 71 ml/min/1 73sq m   Magnesium    Collection Time: 04/29/23  5:42 AM   Result Value Ref Range    Magnesium 2 5 1 6 - 2 6 mg/dL   CBC and differential    Collection Time: 04/29/23  5:42 AM   Result Value Ref Range    WBC 5 95 4 31 - 10 16 Thousand/uL    RBC 5 18 (H) 3 81 - 5 12 Million/uL    Hemoglobin 13 2 11 5 - 15 4 g/dL    Hematocrit 42 7 34 8 - 46 1 %    MCV 82 82 - 98 fL    MCH 25 5 (L) 26 8 - 34 3 pg    MCHC 30 9 (L) 31 4 - 37 4 g/dL    RDW 15 2 (H) 11 6 - 15 1 %    MPV 10 6 8 9 - 12 7 fL    Platelets 984 382 - 820 Thousands/uL    nRBC 0 /100 WBCs    Neutrophils Relative 39 (L) 43 - 75 %    Immat GRANS % 1 0 - 2 %    Lymphocytes Relative 48 (H) 14 - 44 %    Monocytes Relative 9 4 - 12 %    Eosinophils Relative 2 0 - 6 %    Basophils Relative 1 0 - 1 %    Neutrophils Absolute 2 34 1 85 - 7 62 Thousands/µL    Immature Grans Absolute 0 03 0 00 - 0 20 Thousand/uL    Lymphocytes Absolute 2 86 0 60 - 4 47 Thousands/µL    Monocytes Absolute 0 56 0 17 - 1 22 Thousand/µL    Eosinophils Absolute 0 11 0 00 - 0 61 Thousand/µL    Basophils Absolute 0 05 0 00 - 0 10 Thousands/µL   Fingerstick Glucose (POCT)    Collection Time: 04/29/23  5:44 AM   Result Value Ref Range    POC Glucose 117 65 - 140 mg/dl       PPX: lovenox  Diet: reg  Code Status: level 1  Dispo: pending medical therapies, ophtho consult    Plan D/W Dr Shirley Mazariegos and [facility] Team    Estefani Shankar MD

## 2023-06-27 PROBLEM — J02.0 PHARYNGITIS DUE TO GROUP A BETA HEMOLYTIC STREPTOCOCCI: Status: RESOLVED | Noted: 2023-04-28 | Resolved: 2023-06-27

## 2024-01-08 ENCOUNTER — HOSPITAL ENCOUNTER (OUTPATIENT)
Dept: RADIOLOGY | Facility: HOSPITAL | Age: 64
Discharge: HOME | End: 2024-01-08
Payer: COMMERCIAL

## 2024-01-08 ENCOUNTER — APPOINTMENT (OUTPATIENT)
Dept: LAB | Facility: HOSPITAL | Age: 64
End: 2024-01-08
Payer: COMMERCIAL

## 2024-01-08 ENCOUNTER — APPOINTMENT (OUTPATIENT)
Dept: LAB | Facility: HOSPITAL | Age: 64
End: 2024-01-08
Attending: INTERNAL MEDICINE
Payer: COMMERCIAL

## 2024-01-08 DIAGNOSIS — D36.9 PILIFEROUS CYST: ICD-10-CM

## 2024-01-08 DIAGNOSIS — E08.9 DIABETES MELLITUS DUE TO UNDERLYING CONDITION WITHOUT COMPLICATION, WITHOUT LONG-TERM CURRENT USE OF INSULIN (HCC): ICD-10-CM

## 2024-01-08 DIAGNOSIS — K21.9 GASTROESOPHAGEAL REFLUX DISEASE, UNSPECIFIED WHETHER ESOPHAGITIS PRESENT: ICD-10-CM

## 2024-01-08 DIAGNOSIS — Z00.00 HEALTH MAINTENANCE EXAMINATION: ICD-10-CM

## 2024-01-08 DIAGNOSIS — E78.5 HYPERLIPIDEMIA, UNSPECIFIED HYPERLIPIDEMIA TYPE: ICD-10-CM

## 2024-01-08 DIAGNOSIS — I10 ESSENTIAL HYPERTENSION: ICD-10-CM

## 2024-01-08 DIAGNOSIS — C18.2 MALIGNANT NEOPLASM OF ASCENDING COLON (HCC): ICD-10-CM

## 2024-01-08 LAB
25(OH)D3 SERPL-MCNC: 25.3 NG/ML (ref 30–100)
ALBUMIN SERPL BCP-MCNC: 4.3 G/DL (ref 3.5–5)
ALP SERPL-CCNC: 92 U/L (ref 34–104)
ALT SERPL W P-5'-P-CCNC: 23 U/L (ref 7–52)
ANION GAP SERPL CALCULATED.3IONS-SCNC: 6 MMOL/L
AST SERPL W P-5'-P-CCNC: 19 U/L (ref 13–39)
ATRIAL RATE: 63 BPM
BASOPHILS # BLD AUTO: 0.03 THOUSANDS/ÂΜL (ref 0–0.1)
BASOPHILS NFR BLD AUTO: 1 % (ref 0–1)
BILIRUB SERPL-MCNC: 0.53 MG/DL (ref 0.2–1)
BUN SERPL-MCNC: 10 MG/DL (ref 5–25)
CALCIUM SERPL-MCNC: 9.5 MG/DL (ref 8.4–10.2)
CEA SERPL-MCNC: <0.1 NG/ML (ref 0–3)
CHLORIDE SERPL-SCNC: 106 MMOL/L (ref 96–108)
CHOLEST SERPL-MCNC: 181 MG/DL
CO2 SERPL-SCNC: 30 MMOL/L (ref 21–32)
CREAT SERPL-MCNC: 0.81 MG/DL (ref 0.6–1.3)
EOSINOPHIL # BLD AUTO: 0.08 THOUSAND/ÂΜL (ref 0–0.61)
EOSINOPHIL NFR BLD AUTO: 2 % (ref 0–6)
ERYTHROCYTE [DISTWIDTH] IN BLOOD BY AUTOMATED COUNT: 15.1 % (ref 11.6–15.1)
ERYTHROCYTE [SEDIMENTATION RATE] IN BLOOD: 19 MM/HOUR (ref 0–29)
GFR SERPL CREATININE-BSD FRML MDRD: 77 ML/MIN/1.73SQ M
GLUCOSE P FAST SERPL-MCNC: 126 MG/DL (ref 65–99)
HCT VFR BLD AUTO: 44.9 % (ref 34.8–46.1)
HDLC SERPL-MCNC: 46 MG/DL
HGB BLD-MCNC: 14.1 G/DL (ref 11.5–15.4)
IMM GRANULOCYTES # BLD AUTO: 0.01 THOUSAND/UL (ref 0–0.2)
IMM GRANULOCYTES NFR BLD AUTO: 0 % (ref 0–2)
LDLC SERPL CALC-MCNC: 98 MG/DL (ref 0–100)
LYMPHOCYTES # BLD AUTO: 1.91 THOUSANDS/ÂΜL (ref 0.6–4.47)
LYMPHOCYTES NFR BLD AUTO: 38 % (ref 14–44)
MAGNESIUM SERPL-MCNC: 2.3 MG/DL (ref 1.9–2.7)
MCH RBC QN AUTO: 25.2 PG (ref 26.8–34.3)
MCHC RBC AUTO-ENTMCNC: 31.4 G/DL (ref 31.4–37.4)
MCV RBC AUTO: 80 FL (ref 82–98)
MONOCYTES # BLD AUTO: 0.41 THOUSAND/ÂΜL (ref 0.17–1.22)
MONOCYTES NFR BLD AUTO: 8 % (ref 4–12)
NEUTROPHILS # BLD AUTO: 2.61 THOUSANDS/ÂΜL (ref 1.85–7.62)
NEUTS SEG NFR BLD AUTO: 51 % (ref 43–75)
NONHDLC SERPL-MCNC: 135 MG/DL
NRBC BLD AUTO-RTO: 0 /100 WBCS
P AXIS: 29 DEGREES
PLATELET # BLD AUTO: 262 THOUSANDS/UL (ref 149–390)
PMV BLD AUTO: 10.6 FL (ref 8.9–12.7)
POTASSIUM SERPL-SCNC: 4.2 MMOL/L (ref 3.5–5.3)
PR INTERVAL: 164 MS
PROT SERPL-MCNC: 7.6 G/DL (ref 6.4–8.4)
QRS AXIS: -4 DEGREES
QRSD INTERVAL: 84 MS
QT INTERVAL: 476 MS
QTC INTERVAL: 487 MS
RBC # BLD AUTO: 5.59 MILLION/UL (ref 3.81–5.12)
SODIUM SERPL-SCNC: 142 MMOL/L (ref 135–147)
T WAVE AXIS: -8 DEGREES
TRIGL SERPL-MCNC: 185 MG/DL
TSH SERPL DL<=0.05 MIU/L-ACNC: 0.94 UIU/ML (ref 0.45–4.5)
URATE SERPL-MCNC: 5.1 MG/DL (ref 2–7.5)
VENTRICULAR RATE: 63 BPM
WBC # BLD AUTO: 5.05 THOUSAND/UL (ref 4.31–10.16)

## 2024-01-08 PROCEDURE — 82306 VITAMIN D 25 HYDROXY: CPT

## 2024-01-08 PROCEDURE — 80061 LIPID PANEL: CPT

## 2024-01-08 PROCEDURE — 84443 ASSAY THYROID STIM HORMONE: CPT

## 2024-01-08 PROCEDURE — 83735 ASSAY OF MAGNESIUM: CPT

## 2024-01-08 PROCEDURE — 71046 X-RAY EXAM CHEST 2 VIEWS: CPT

## 2024-01-08 PROCEDURE — 36415 COLL VENOUS BLD VENIPUNCTURE: CPT

## 2024-01-08 PROCEDURE — 85025 COMPLETE CBC W/AUTO DIFF WBC: CPT

## 2024-01-08 PROCEDURE — 82378 CARCINOEMBRYONIC ANTIGEN: CPT

## 2024-01-08 PROCEDURE — 80053 COMPREHEN METABOLIC PANEL: CPT

## 2024-01-08 PROCEDURE — 85652 RBC SED RATE AUTOMATED: CPT

## 2024-01-08 PROCEDURE — 84550 ASSAY OF BLOOD/URIC ACID: CPT

## 2024-01-23 NOTE — PROGRESS NOTES
Progress Note - Cardiology Office  [facility]    Najma Norris 63 y.o. female MRN: [id number]  : 1960  Encounter: [id number]      Assessment:     Essential hypertension.  Hyperlipidemia.  Type II diabetes.  Colon cancer s/p resection.  Class I obesity.    Discussion Summary and Plan:    Essential hypertension.  - BP during today's office visit elevated. 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  Also discloses that she has not taken her BP medications yet today. She states she monitors her BP regularly and her systolic BP ranges from the 130s to the 140s.  - Asked that patient continue to monitor BP.  Discussed that if systolic BP is greater than 150s to please notify our office.  - Currently on amlodipine 5 mg daily and hydrochlorothiazide 25 mg daily.  - 22 TTE: LVEF 55%. Diastolic function is mildly abnormal, consistent with grade I (abnormal) relaxation.  Trace mitral regurgitation.  Mild tricuspid valve regurgitation.  Right ventricle systolic pressure is normal, 28 mmHg.  Trace pulmonic valve regurgitation.  - 22 nuclear stress test: LVEF 55%. The ECG and SPECT imaging portions of the stress study are concordant with no evidence of stress induced myocardial ischemia.     Hyperlipidemia.  - 24 lipid panel: Cholesterol 118, triglycerides 185, HDL 46, LDL 98.  - Continue Lipitor 20 mg daily.  - Discussed with patient recommendations for low carbohydrate diet.    Type II diabetes.  - 3/13/23 HgbA1c: 6.4.   - Care per PCP.     Colon cancer s/p resection.  - s/p laparoscopic right hemicolectomy on 6/15/2022.  - Follows outpatient with [facility] surgical oncology.    Class I obesity.  - BMI 33.36 kg/m2.       Patient / Caretaker was advised and educated to call our office  immediately if  patient has any new symptoms of chest pain/shortness of breath, near-syncope, syncope, light  headedness sustained palpitations  or any other cardiovascular symptoms before their scheduled follow-up appointment.  Office number was provided #[phone].  Please call [phone] if any questions.  Counseling :  A description of the counseling.  Goals and Barriers.  Patient's ability to self care: Yes  Medication side effect reviewed with patient in detail and all their questions answered to their satisfaction.    HPI :     Najma Norris is a 63 y.o. female with PMHx HTN, HLD, DMII, colon cancer s/p resection (6/2022) who presents for outpatient cardiology follow-up.     Patient was last seen by outpatient cardiology on 5/2/2022 for preoperative cardiac restratification for right colon resection in setting of colon/cecal mass.  Patient underwent laparoscopic right hemicolectomy on 6/15/2022.      Patient reports since last outpatient cardiology office visit on 5/2/22 she has overall been doing well.  BP during today's office visit concerning for hypertensive urgency.  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  Also discloses that she has not taken her BP medications yet today. She states she monitors her BP regularly and her systolic BP ranges from the 130s to the 140s.      Patient denies experiencing chest pain, palpitations, shortness of breath at rest or with exertion, lower extremity edema, orthopnea, lightheadedness, dizziness, headache, nausea, vomiting.    Review of Systems   All other systems reviewed and are negative.      Historical Information   Past Medical History:   Diagnosis Date    Arthritis     Diabetes mellitus (HCC)     Gout     Hyperlipidemia     Hypertension      Past Surgical History:   Procedure Laterality Date    BILATERAL OOPHORECTOMY      HYSTERECTOMY  01/01/2015    OOPHORECTOMY Bilateral 2015    THYROID SURGERY      2001    UTERINE FIBROID SURGERY      5 taken out 2017 [facility]      Social  History     Substance and Sexual Activity   Alcohol Use No     Social History     Substance and Sexual Activity   Drug Use No     Social History     Tobacco Use   Smoking Status Former    Current packs/day: 0.00    Types: Cigarettes    Quit date:     Years since quittin.0   Smokeless Tobacco Never     Family History:   Family History   Problem Relation Age of Onset    Prostate cancer Father 84    No Known Problems Mother     No Known Problems Daughter     No Known Problems Maternal Aunt     No Known Problems Sister     No Known Problems Sister        Meds/Allergies     No Known Allergies    Current Outpatient Medications:     allopurinol (ZYLOPRIM) 300 mg tablet, Take 1 tablet (300 mg total) by mouth daily, Disp: 90 tablet, Rfl: 0    amLODIPine (NORVASC) 5 mg tablet, take 1 tablet by mouth once daily, Disp: 90 tablet, Rfl: 1    aspirin (ECOTRIN LOW STRENGTH) 81 mg EC tablet, Take 81 mg by mouth daily Last dose 22, Disp: , Rfl:     atorvastatin (LIPITOR) 20 mg tablet, Take 1 tablet (20 mg total) by mouth daily, Disp: 90 tablet, Rfl: 1    Blood Pressure Monitoring (SPHYGMOMANOMETER) MISC, by Does not apply route daily, Disp: 1 each, Rfl: 0    Cholecalciferol (Vitamin D3) 50 MCG (2000 UT) TABS, Take 2,000 Units by mouth daily  , Disp: , Rfl:     hydrochlorothiazide (HYDRODIURIL) 25 mg tablet, take 1 tablet by mouth once daily, Disp: 90 tablet, Rfl: 1    ibuprofen (MOTRIN) 400 mg tablet, Take 400 mg by mouth, Disp: , Rfl:     meloxicam (MOBIC) 15 mg tablet, Take 1 tablet by mouth daily as needed, Disp: , Rfl:     metFORMIN (GLUCOPHAGE-XR) 500 mg 24 hr tablet, Take 1 tablet (500 mg total) by mouth daily with dinner, Disp: 90 tablet, Rfl: 0    Multiple Vitamins-Minerals (multivitamin with minerals) tablet, Take 1 tablet by mouth daily Women's 50 plus daily, Disp: 90 tablet, Rfl: 1    pantoprazole (PROTONIX) 40 mg tablet, Take 1 tablet (40 mg total) by mouth daily Take pantoprazole twice a day for 2 weeks and  "then once a day, Disp: 60 tablet, Rfl: 11    Vitals: Blood pressure (!) 180/100, pulse 66, height 5' 7\" (1.702 m), weight 96.6 kg (213 lb), SpO2 97%, not currently breastfeeding.    Body mass index is 33.36 kg/m².  Wt Readings from Last 3 Encounters:   24 96.6 kg (213 lb)   23 92 kg (202 lb 14.4 oz)   22 91.2 kg (201 lb)     Vitals:    24 0825   Weight: 96.6 kg (213 lb)     BP Readings from Last 3 Encounters:   24 (!) 180/100   23 163/94   23 122/86       Physical Exam:  Physical Exam  Vitals reviewed.   Constitutional:       General: She is not in acute distress.     Appearance: She is obese.   Cardiovascular:      Rate and Rhythm: Normal rate and regular rhythm.      Pulses: Normal pulses.      Heart sounds: Murmur heard.   Pulmonary:      Effort: Pulmonary effort is normal. No respiratory distress.      Breath sounds: Normal breath sounds.   Abdominal:      General: Abdomen is flat. There is no distension.      Palpations: Abdomen is soft.      Tenderness: There is no abdominal tenderness.   Musculoskeletal:      Right lower leg: No edema.      Left lower leg: No edema.   Skin:     General: Skin is warm and dry.   Neurological:      Mental Status: She is alert and oriented to person, place, and time.         Diagnostic Studies Review Cardio:      EK/24/24 EKG: Sinus rhythm, 66 bpm.  LVH.      Cardiac testing:     Results for orders placed during the hospital encounter of 22    NM myocardial perfusion spect (rx stress and/or rest)    Interpretation Summary    Stress Combined Conclusion: The ECG and SPECT imaging portions of the stress study are concordant with no evidence of stress induced myocardial ischemia.    Perfusion: There are no perfusion defects.    Stress Function: Left ventricular function post-stress is normal. Post-stress ejection fraction is 55 %.    Stress ECG: No ST deviation is noted. The ECG was not diagnostic due to pharmacological " (vasodilator) stress.     Echo complete   Result date: 5/25/22    Left Ventricle Left ventricular cavity size is normal. Wall thickness is mildly increased. The left ventricular ejection fraction is 55% by visual estimation. Systolic function is normal.  Wall motion is normal. Diastolic function is mildly abnormal, consistent with grade I (abnormal) relaxation.   Right Ventricle Right ventricular cavity size is normal. Systolic function is normal. Wall thickness is normal.   Left Atrium The atrium is normal in size (16-34 mL/m2).   Right Atrium The atrium is normal in size.   Aortic Valve The aortic valve is trileaflet. The leaflets are not thickened. The leaflets are not calcified. The leaflets exhibit normal mobility. There is no evidence of regurgitation. The Aortic Valve has no significant stenosis.   Mitral Valve The mitral valve has normal structure and function. There is trace regurgitation. There is no evidence of stenosis.   Tricuspid Valve Tricuspid valve structure is normal. There is mild regurgitation. There is no evidence of stenosis. The right ventricular systolic pressure is normal. The estimated right ventricular systolic pressure is 28.00 mmHg.   Pulmonic Valve Pulmonic valve structure is normal. There is trace regurgitation. There is no evidence of stenosis.   Ascending Aorta The aortic root is normal in size.   IVC/SVC The right atrial pressure is estimated at 8.0 mmHg. The inferior vena cava is normal in size.   Pericardium There is no pericardial effusion. The pericardium is normal in appearance.           Imaging:  Chest X-Ray:     CT-scan of the chest:     No CTA results available for this patient.  Lab Review   Lab Results   Component Value Date    WBC 5.05 01/08/2024    HGB 14.1 01/08/2024    HCT 44.9 01/08/2024    MCV 80 (L) 01/08/2024    RDW 15.1 01/08/2024     01/08/2024     BMP:  Lab Results   Component Value Date    SODIUM 142 01/08/2024    K 4.2 01/08/2024     01/08/2024  "   CO2 30 01/08/2024    BUN 10 01/08/2024    CREATININE 0.81 01/08/2024    GLUC 135 04/29/2023    GLUF 126 (H) 01/08/2024    CALCIUM 9.5 01/08/2024    CORRECTEDCA 9.7 04/29/2023    EGFR 77 01/08/2024    MG 2.3 01/08/2024     Troponins:    LFT:  Lab Results   Component Value Date    AST 19 01/08/2024    ALT 23 01/08/2024    ALKPHOS 92 01/08/2024    TP 7.6 01/08/2024    ALB 4.3 01/08/2024      No components found for: \"TSH3\"  Lab Results   Component Value Date    ZBH2LHCCYPPT 0.935 01/08/2024     Lab Results   Component Value Date    HGBA1C 6.4 (H) 03/13/2023     Lipid Profile:   Lab Results   Component Value Date    CHOLESTEROL 181 01/08/2024    HDL 46 (L) 01/08/2024    LDLCALC 98 01/08/2024    TRIG 185 (H) 01/08/2024     Lab Results   Component Value Date    CHOLESTEROL 181 01/08/2024    CHOLESTEROL 201 (H) 03/13/2023     Lab Results   Component Value Date    TROPONINI <0.02 09/13/2019     Lab Results   Component Value Date    NTBNP 7 03/13/2023          Araceli Gilbert PA-C  "

## 2024-01-24 ENCOUNTER — OFFICE VISIT (OUTPATIENT)
Dept: CARDIOLOGY CLINIC | Facility: CLINIC | Age: 64
End: 2024-01-24
Payer: COMMERCIAL

## 2024-01-24 VITALS
WEIGHT: 213 LBS | HEART RATE: 66 BPM | HEIGHT: 67 IN | OXYGEN SATURATION: 97 % | BODY MASS INDEX: 33.43 KG/M2 | SYSTOLIC BLOOD PRESSURE: 180 MMHG | DIASTOLIC BLOOD PRESSURE: 100 MMHG

## 2024-01-24 DIAGNOSIS — R60.0 LOWER EXTREMITY EDEMA: ICD-10-CM

## 2024-01-24 DIAGNOSIS — I10 HYPERTENSION, UNSPECIFIED TYPE: Primary | ICD-10-CM

## 2024-01-24 DIAGNOSIS — R06.02 SHORTNESS OF BREATH: ICD-10-CM

## 2024-01-24 DIAGNOSIS — E78.5 HYPERLIPIDEMIA, UNSPECIFIED HYPERLIPIDEMIA TYPE: ICD-10-CM

## 2024-01-24 PROCEDURE — 93000 ELECTROCARDIOGRAM COMPLETE: CPT | Performed by: PHYSICIAN ASSISTANT

## 2024-01-24 PROCEDURE — 99214 OFFICE O/P EST MOD 30 MIN: CPT | Performed by: PHYSICIAN ASSISTANT

## 2024-08-20 ENCOUNTER — HOSPITAL ENCOUNTER (EMERGENCY)
Facility: HOSPITAL | Age: 64
Discharge: HOME | End: 2024-08-21
Attending: EMERGENCY MEDICINE
Payer: COMMERCIAL

## 2024-08-20 DIAGNOSIS — M54.9 BACK PAIN: Primary | ICD-10-CM

## 2024-08-20 PROCEDURE — 99284 EMERGENCY DEPT VISIT MOD MDM: CPT

## 2024-08-21 ENCOUNTER — APPOINTMENT (EMERGENCY)
Dept: RADIOLOGY | Facility: HOSPITAL | Age: 64
End: 2024-08-21
Payer: COMMERCIAL

## 2024-08-21 VITALS
DIASTOLIC BLOOD PRESSURE: 93 MMHG | TEMPERATURE: 98.2 F | RESPIRATION RATE: 19 BRPM | SYSTOLIC BLOOD PRESSURE: 197 MMHG | OXYGEN SATURATION: 95 % | HEART RATE: 56 BPM

## 2024-08-21 LAB
ALBUMIN SERPL BCG-MCNC: 4.5 G/DL (ref 3.5–5)
ALP SERPL-CCNC: 82 U/L (ref 34–104)
ALT SERPL W P-5'-P-CCNC: 20 U/L (ref 7–52)
ANION GAP SERPL CALCULATED.3IONS-SCNC: 7 MMOL/L (ref 4–13)
AST SERPL W P-5'-P-CCNC: 44 U/L (ref 13–39)
ATRIAL RATE: 54 BPM
BASOPHILS # BLD AUTO: 0.04 THOUSANDS/ÂΜL (ref 0–0.1)
BASOPHILS NFR BLD AUTO: 1 % (ref 0–1)
BILIRUB SERPL-MCNC: 0.23 MG/DL (ref 0.2–1)
BILIRUB UR QL STRIP: NEGATIVE
BUN SERPL-MCNC: 8 MG/DL (ref 5–25)
CALCIUM SERPL-MCNC: 9 MG/DL (ref 8.4–10.2)
CARDIAC TROPONIN I PNL SERPL HS: 3 NG/L
CHLORIDE SERPL-SCNC: 107 MMOL/L (ref 96–108)
CLARITY UR: CLEAR
CO2 SERPL-SCNC: 22 MMOL/L (ref 21–32)
COLOR UR: ABNORMAL
CREAT SERPL-MCNC: 0.74 MG/DL (ref 0.6–1.3)
EOSINOPHIL # BLD AUTO: 0.16 THOUSAND/ÂΜL (ref 0–0.61)
EOSINOPHIL NFR BLD AUTO: 3 % (ref 0–6)
ERYTHROCYTE [DISTWIDTH] IN BLOOD BY AUTOMATED COUNT: 15.2 % (ref 11.6–15.1)
GFR SERPL CREATININE-BSD FRML MDRD: 85 ML/MIN/1.73SQ M
GLUCOSE SERPL-MCNC: 118 MG/DL (ref 65–140)
GLUCOSE UR STRIP-MCNC: ABNORMAL MG/DL
HCT VFR BLD AUTO: 44.5 % (ref 34.8–46.1)
HGB BLD-MCNC: 13.4 G/DL (ref 11.5–15.4)
HGB UR QL STRIP.AUTO: NEGATIVE
IMM GRANULOCYTES # BLD AUTO: 0.01 THOUSAND/UL (ref 0–0.2)
IMM GRANULOCYTES NFR BLD AUTO: 0 % (ref 0–2)
KETONES UR STRIP-MCNC: NEGATIVE MG/DL
LEUKOCYTE ESTERASE UR QL STRIP: NEGATIVE
LYMPHOCYTES # BLD AUTO: 2.9 THOUSANDS/ÂΜL (ref 0.6–4.47)
LYMPHOCYTES NFR BLD AUTO: 53 % (ref 14–44)
MCH RBC QN AUTO: 25.4 PG (ref 26.8–34.3)
MCHC RBC AUTO-ENTMCNC: 30.1 G/DL (ref 31.4–37.4)
MCV RBC AUTO: 84 FL (ref 82–98)
MONOCYTES # BLD AUTO: 0.37 THOUSAND/ÂΜL (ref 0.17–1.22)
MONOCYTES NFR BLD AUTO: 7 % (ref 4–12)
NEUTROPHILS # BLD AUTO: 1.98 THOUSANDS/ÂΜL (ref 1.85–7.62)
NEUTS SEG NFR BLD AUTO: 36 % (ref 43–75)
NITRITE UR QL STRIP: NEGATIVE
NRBC BLD AUTO-RTO: 0 /100 WBCS
P AXIS: 9 DEGREES
PH UR STRIP.AUTO: 7.5 [PH]
PLATELET # BLD AUTO: 242 THOUSANDS/UL (ref 149–390)
PMV BLD AUTO: 11.7 FL (ref 8.9–12.7)
POTASSIUM SERPL-SCNC: 5.5 MMOL/L (ref 3.5–5.3)
PR INTERVAL: 176 MS
PROT SERPL-MCNC: 7.7 G/DL (ref 6.4–8.4)
PROT UR STRIP-MCNC: NEGATIVE MG/DL
QRS AXIS: -11 DEGREES
QRSD INTERVAL: 92 MS
QT INTERVAL: 534 MS
QTC INTERVAL: 506 MS
RBC # BLD AUTO: 5.27 MILLION/UL (ref 3.81–5.12)
SODIUM SERPL-SCNC: 136 MMOL/L (ref 135–147)
SP GR UR STRIP.AUTO: 1.01 (ref 1–1.03)
T WAVE AXIS: -38 DEGREES
UROBILINOGEN UR STRIP-ACNC: <2 MG/DL
VENTRICULAR RATE: 54 BPM
WBC # BLD AUTO: 5.46 THOUSAND/UL (ref 4.31–10.16)

## 2024-08-21 PROCEDURE — 93005 ELECTROCARDIOGRAM TRACING: CPT

## 2024-08-21 PROCEDURE — 74177 CT ABD & PELVIS W/CONTRAST: CPT

## 2024-08-21 PROCEDURE — 96365 THER/PROPH/DIAG IV INF INIT: CPT

## 2024-08-21 PROCEDURE — 99285 EMERGENCY DEPT VISIT HI MDM: CPT | Performed by: EMERGENCY MEDICINE

## 2024-08-21 PROCEDURE — 96375 TX/PRO/DX INJ NEW DRUG ADDON: CPT

## 2024-08-21 PROCEDURE — 80053 COMPREHEN METABOLIC PANEL: CPT | Performed by: EMERGENCY MEDICINE

## 2024-08-21 PROCEDURE — 36415 COLL VENOUS BLD VENIPUNCTURE: CPT | Performed by: EMERGENCY MEDICINE

## 2024-08-21 PROCEDURE — 85025 COMPLETE CBC W/AUTO DIFF WBC: CPT | Performed by: EMERGENCY MEDICINE

## 2024-08-21 PROCEDURE — 71260 CT THORAX DX C+: CPT

## 2024-08-21 PROCEDURE — 93010 ELECTROCARDIOGRAM REPORT: CPT | Performed by: INTERNAL MEDICINE

## 2024-08-21 PROCEDURE — 84484 ASSAY OF TROPONIN QUANT: CPT | Performed by: EMERGENCY MEDICINE

## 2024-08-21 PROCEDURE — 96366 THER/PROPH/DIAG IV INF ADDON: CPT

## 2024-08-21 RX ORDER — HYDROMORPHONE HCL/PF 1 MG/ML
1 SYRINGE (ML) INJECTION ONCE
Status: COMPLETED | OUTPATIENT
Start: 2024-08-21 | End: 2024-08-21

## 2024-08-21 RX ORDER — KETOROLAC TROMETHAMINE 30 MG/ML
15 INJECTION, SOLUTION INTRAMUSCULAR; INTRAVENOUS ONCE
Status: COMPLETED | OUTPATIENT
Start: 2024-08-21 | End: 2024-08-21

## 2024-08-21 RX ORDER — MAGNESIUM SULFATE HEPTAHYDRATE 40 MG/ML
2 INJECTION, SOLUTION INTRAVENOUS ONCE
Status: COMPLETED | OUTPATIENT
Start: 2024-08-21 | End: 2024-08-21

## 2024-08-21 RX ADMIN — KETOROLAC TROMETHAMINE 15 MG: 30 INJECTION, SOLUTION INTRAMUSCULAR; INTRAVENOUS at 00:25

## 2024-08-21 RX ADMIN — IOHEXOL 100 ML: 350 INJECTION, SOLUTION INTRAVENOUS at 02:19

## 2024-08-21 RX ADMIN — SODIUM CHLORIDE 1000 ML: 0.9 INJECTION, SOLUTION INTRAVENOUS at 00:23

## 2024-08-21 RX ADMIN — HYDROMORPHONE HYDROCHLORIDE 1 MG: 1 INJECTION, SOLUTION INTRAMUSCULAR; INTRAVENOUS; SUBCUTANEOUS at 00:25

## 2024-08-21 RX ADMIN — MAGNESIUM SULFATE HEPTAHYDRATE 2 G: 40 INJECTION, SOLUTION INTRAVENOUS at 00:28

## 2024-08-21 NOTE — ED CARE HANDOFF
Emergency Department Sign Out Note        Sign out and transfer of care from Dr. Engel. See Separate Emergency Department note.     The patient, Najma Norris, was evaluated by the previous provider for Back pain.    Workup Completed:  Labs    ED Course / Workup Pending (followup):  CT pending. Showed no acute process. Patient pain improved. Will d/c. Encouraged outpatient follow up and OTC medications.                                  ED Course as of 08/21/24 0358   Wed Aug 21, 2024   0119 Back pain on the right side.  Dispo pending that.     Procedures  Medical Decision Making  Amount and/or Complexity of Data Reviewed  Labs: ordered.  Radiology: ordered.    Risk  Prescription drug management.            Disposition  Final diagnoses:   Back pain     Time reflects when diagnosis was documented in both MDM as applicable and the Disposition within this note       Time User Action Codes Description Comment    8/21/2024  1:24 AM Anjelica Engel Add [M54.9] Back pain           ED Disposition       ED Disposition   Discharge    Condition   Stable    Date/Time   Wed Aug 21, 2024  3:57 AM    Comment   Najma Norris discharge to home/self care.                   Follow-up Information       Follow up With Specialties Details Why Contact Info Additional Information    [facility] Emergency Department Emergency Medicine Go to  If symptoms worsen, As needed [address] [place] [id number] [phone] [facility] hospital Emergency Department, [address], [place], [address]          Patient's Medications   Discharge Prescriptions    No medications on file     No discharge procedures on file.       ED Provider  Electronically Signed by     Jose F Armas MD  08/21/24 0358

## 2024-08-21 NOTE — DISCHARGE INSTRUCTIONS
Continue Tylenol or Profen at home for your back pain.  Please follow-up with your primary care doctor.  Return to ER if you develop any new or worrisome symptoms.

## 2024-08-21 NOTE — ED PROVIDER NOTES
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  Pt took Tylenol today between 17:00-18:00  with no relief.     64-year-old female with past history of morbid obesity, hypertension, gout, arthritis, diabetes, hyperlipidemia, presents to the ED for evaluation of right-sided flank pain radiating to right upper back over the past few days.  Patient works as a CNA.  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  Patient denies any trauma, injuries, or any other falls.  Patient is trying over-the-counter Tylenol without relief.  Pain has increased to 10 out of 10 today.  Subsequently patient came to the ED for further evaluation.  Patient denies any fevers or chills.  Patient denies any chest pain or shortness of breath.      Back Pain  Associated symptoms: no abdominal pain, no chest pain, no dysuria and no fever        Prior to Admission Medications   Prescriptions Last Dose Informant Patient Reported? Taking?   Blood Pressure Monitoring (SPHYGMOMANOMETER) MISC  Self No No   Sig: by Does not apply route daily   Cholecalciferol (Vitamin D3) 50 MCG (2000 UT) TABS  Self Yes No   Sig: Take 2,000 Units by mouth daily     Multiple Vitamins-Minerals (multivitamin with minerals) tablet   No No   Sig: Take 1 tablet by mouth daily Women's 50 plus daily   allopurinol (ZYLOPRIM) 300 mg tablet  Self No No   Sig: Take 1 tablet (300 mg total) by mouth daily   amLODIPine (NORVASC) 5 mg tablet   No No   Sig: take 1 tablet by mouth once daily   aspirin (ECOTRIN LOW STRENGTH) 81 mg EC tablet  Self Yes No   Sig: Take 81 mg by mouth daily Last dose 5/26/22   atorvastatin (LIPITOR) 20 mg tablet   No No   Sig: Take 1 tablet (20 mg total) by mouth daily   hydrochlorothiazide (HYDRODIURIL) 25 mg tablet   No No   Sig: take 1 tablet by mouth once daily   ibuprofen (MOTRIN) 400 mg  tablet   Yes No   Sig: Take 400 mg by mouth   meloxicam (MOBIC) 15 mg tablet   Yes No   Sig: Take 1 tablet by mouth daily as needed   metFORMIN (GLUCOPHAGE-XR) 500 mg 24 hr tablet   No No   Sig: Take 1 tablet (500 mg total) by mouth daily with dinner   pantoprazole (PROTONIX) 40 mg tablet   No No   Sig: Take 1 tablet (40 mg total) by mouth daily Take pantoprazole twice a day for 2 weeks and then once a day      Facility-Administered Medications: None       Past Medical History:   Diagnosis Date    Arthritis     Diabetes mellitus (HCC)     Gout     Hyperlipidemia     Hypertension        Past Surgical History:   Procedure Laterality Date    BILATERAL OOPHORECTOMY      HYSTERECTOMY  2015    OOPHORECTOMY Bilateral     THYROID SURGERY      2001    UTERINE FIBROID SURGERY      5 taken out 2017 [facility]        Family History   Problem Relation Age of Onset    Prostate cancer Father 84    No Known Problems Mother     No Known Problems Daughter     No Known Problems Maternal Aunt     No Known Problems Sister     No Known Problems Sister      I have reviewed and agree with the history as documented.    E-Cigarette/Vaping    E-Cigarette Use Never User      E-Cigarette/Vaping Substances    Nicotine No     THC No     CBD No     Flavoring No     Other No     Unknown No      Social History     Tobacco Use    Smoking status: Former     Current packs/day: 0.00     Types: Cigarettes     Quit date:      Years since quittin.6    Smokeless tobacco: Never   Vaping Use    Vaping status: Never Used   Substance Use Topics    Alcohol use: No    Drug use: No       Review of Systems   Constitutional:  Negative for chills and fever.   HENT:  Negative for ear pain and sore throat.    Eyes:  Negative for pain and visual disturbance.   Respiratory:  Negative for cough and shortness of breath.    Cardiovascular:  Negative for chest pain and palpitations.   Gastrointestinal:  Negative for abdominal pain and  vomiting.   Genitourinary:  Negative for dysuria and hematuria.   Musculoskeletal:  Positive for back pain. Negative for arthralgias.   Skin:  Negative for color change and rash.   Neurological:  Negative for seizures and syncope.   All other systems reviewed and are negative.      Physical Exam  Physical Exam  Vitals and nursing note reviewed.   Constitutional:       General: She is not in acute distress.     Appearance: She is well-developed.   HENT:      Head: Normocephalic and atraumatic.   Eyes:      Conjunctiva/sclera: Conjunctivae normal.   Cardiovascular:      Rate and Rhythm: Normal rate and regular rhythm.      Heart sounds: No murmur heard.  Pulmonary:      Effort: Pulmonary effort is normal. No respiratory distress.      Breath sounds: Normal breath sounds.   Abdominal:      Palpations: Abdomen is soft.      Tenderness: There is no abdominal tenderness. There is right CVA tenderness.      Comments: Abdomen is soft, nondistended, with also present all 4 quadrants.  No tenderness noted to palpation of anterior abdomen.  Some tenderness noted to the right side of abdomen in the mid axillary line.   Musculoskeletal:         General: No swelling.      Cervical back: Neck supple.      Comments: Moderate paraspinous tenderness to palpation noted to right side of lumbar sacral region.  No mid spinous tenderness to palpation noted to thoracic and lumbar spine.   Skin:     General: Skin is warm and dry.      Capillary Refill: Capillary refill takes less than 2 seconds.   Neurological:      Mental Status: She is alert.   Psychiatric:         Mood and Affect: Mood normal.         Vital Signs  ED Triage Vitals [08/21/24 0000]   Temperature Pulse Respirations Blood Pressure SpO2   98.2 °F (36.8 °C) 63 18 (!) 206/107 100 %      Temp src Heart Rate Source Patient Position - Orthostatic VS BP Location FiO2 (%)   -- Monitor Sitting Left arm --      Pain Score       10 - Worst Possible Pain           Vitals:    08/21/24  0030 08/21/24 0045 08/21/24 0100 08/21/24 0115   BP: (!) 172/96 (!) 187/97 (!) 188/102 (!) 181/103   Pulse: (!) 53 56 56 56   Patient Position - Orthostatic VS: Lying Lying Lying Lying         Visual Acuity  Visual Acuity      Flowsheet Row Most Recent Value   L Pupil Size (mm) 3   R Pupil Size (mm) 3            ED Medications  Medications   magnesium sulfate 2 g/50 mL IVPB (premix) 2 g (2 g Intravenous New Bag 8/21/24 0028)   sodium chloride 0.9 % bolus 1,000 mL (1,000 mL Intravenous New Bag 8/21/24 0023)   HYDROmorphone (DILAUDID) injection 1 mg (1 mg Intravenous Given 8/21/24 0025)   ketorolac (TORADOL) injection 15 mg (15 mg Intravenous Given 8/21/24 0025)       Diagnostic Studies  Results Reviewed       Procedure Component Value Units Date/Time    Comprehensive metabolic panel [[id number]]  (Abnormal) Collected: 08/21/24 0020    Lab Status: Final result Specimen: Blood from Arm, Left Updated: 08/21/24 0050     Sodium 136 mmol/L      Potassium 5.5 mmol/L      Chloride 107 mmol/L      CO2 22 mmol/L      ANION GAP 7 mmol/L      BUN 8 mg/dL      Creatinine 0.74 mg/dL      Glucose 118 mg/dL      Calcium 9.0 mg/dL      AST 44 U/L      ALT 20 U/L      Alkaline Phosphatase 82 U/L      Total Protein 7.7 g/dL      Albumin 4.5 g/dL      Total Bilirubin 0.23 mg/dL      eGFR 85 ml/min/1.73sq m     Narrative:      [employer] guidelines for Chronic Kidney Disease (CKD):     Stage 1 with normal or high GFR (GFR > 90 mL/min/1.73 square meters)    Stage 2 Mild CKD (GFR = 60-89 mL/min/1.73 square meters)    Stage 3A Moderate CKD (GFR = 45-59 mL/min/1.73 square meters)    Stage 3B Moderate CKD (GFR = 30-44 mL/min/1.73 square meters)    Stage 4 Severe CKD (GFR = 15-29 mL/min/1.73 square meters)    Stage 5 End Stage CKD (GFR <15 mL/min/1.73 square meters)  Note: GFR calculation is accurate only with a steady state creatinine    HS Troponin 0hr (reflex protocol) [[id number]]  (Normal) Collected: 08/21/24 0020     Lab Status: Final result Specimen: Blood from Arm, Left Updated: 08/21/24 0048     hs TnI 0hr 3 ng/L     HS Troponin I 2hr [[id number]]     Lab Status: No result Specimen: Blood     CBC and differential [[id number]]  (Abnormal) Collected: 08/21/24 0020    Lab Status: Final result Specimen: Blood from Arm, Left Updated: 08/21/24 0026     WBC 5.46 Thousand/uL      RBC 5.27 Million/uL      Hemoglobin 13.4 g/dL      Hematocrit 44.5 %      MCV 84 fL      MCH 25.4 pg      MCHC 30.1 g/dL      RDW 15.2 %      MPV 11.7 fL      Platelets 242 Thousands/uL      nRBC 0 /100 WBCs      Segmented % 36 %      Immature Grans % 0 %      Lymphocytes % 53 %      Monocytes % 7 %      Eosinophils Relative 3 %      Basophils Relative 1 %      Absolute Neutrophils 1.98 Thousands/µL      Absolute Immature Grans 0.01 Thousand/uL      Absolute Lymphocytes 2.90 Thousands/µL      Absolute Monocytes 0.37 Thousand/µL      Eosinophils Absolute 0.16 Thousand/µL      Basophils Absolute 0.04 Thousands/µL     UA w Reflex to Microscopic w Reflex to Culture [[id number]]     Lab Status: No result Specimen: Urine                    CT chest abdomen pelvis w contrast    (Results Pending)              Procedures  ECG 12 Lead Documentation Only    Date/Time: 8/21/2024 12:50 AM    Performed by: Anjelica Engel DO  Authorized by: Anjelica Engel DO    Indications / Diagnosis:  Back pain  ECG reviewed by me, the ED Provider: yes    Patient location:  ED  Previous ECG:     Previous ECG:  Compared to current    Similarity:  No change    Comparison to cardiac monitor: Yes    Interpretation:     Interpretation: abnormal    Comments:      Sinus rhythm, rate 54, normal axis, normal intervals, no acute ST elevations noted, T wave inversions noted in V2 through V6 suggesting lateral ischemic changes that is unchanged from previous study, LVH noted.           ED Course                                 SBIRT 20yo+      Flowsheet Row Most Recent Value   Initial Alcohol  Screen: US AUDIT-C     1. How often do you have a drink containing alcohol? 0 Filed at: 08/20/2024 2359   2. How many drinks containing alcohol do you have on a typical day you are drinking?  0 Filed at: 08/20/2024 2359   3b. FEMALE Any Age, or MALE 65+: How often do you have 4 or more drinks on one occassion? 0 Filed at: 08/20/2024 2359   Audit-C Score 0 Filed at: 08/20/2024 2359   KELSEA: How many times in the past year have you...    Used an illegal drug or used a prescription medication for non-medical reasons? Never Filed at: 08/20/2024 2359                      Medical Decision Making  Obtain blood work, UA, CT chest/abdomen/pelvis  Give IV fluids, pain medication and continue to monitor patient for any worsening symptoms.    Patient's lab work was essentially unremarkable.  Elevated potassium noted however specimen was moderately hemolyzed.  No EKG changes noted.  Patient is pending CT CT scan.  Care of patient signed out to the next attending will follow-up with CT study and reassess patient then dispo patient appropriately.  Close return instructions given to return to the ER for any worsening symptoms.  Patient agrees with discharge plan.  Patient well appearing at time of discharge.    Please Note: Fluency Direct voice recognition software may have been used in the creation of this document. Wrong words or sound a like substitutions may have occurred due to the inherent limitations of the voice software.         Amount and/or Complexity of Data Reviewed  External Data Reviewed: ECG.  Labs: ordered. Decision-making details documented in ED Course.  Radiology: ordered. Decision-making details documented in ED Course.  ECG/medicine tests: ordered and independent interpretation performed. Decision-making details documented in ED Course.    Risk  Prescription drug management.                 Disposition  Final diagnoses:   Back pain     Time reflects when diagnosis was documented in both MDM as applicable and  the Disposition within this note       Time User Action Codes Description Comment    8/21/2024  1:24 AM Anjelica Engel Add [M54.9] Back pain           ED Disposition       None          Follow-up Information    None         Patient's Medications   Discharge Prescriptions    No medications on file       No discharge procedures on file.    PDMP Review       None            ED Provider  Electronically Signed by             Anjelica Engel DO  08/21/24 0128

## 2024-08-28 DIAGNOSIS — E11.9 TYPE 2 DIABETES MELLITUS WITHOUT COMPLICATION, WITHOUT LONG-TERM CURRENT USE OF INSULIN (HCC): Primary | ICD-10-CM

## 2024-08-28 DIAGNOSIS — B02.30 HERPES ZOSTER OPHTHALMICUS OF LEFT EYE: ICD-10-CM

## 2024-09-18 ENCOUNTER — OFFICE VISIT (OUTPATIENT)
Age: 64
End: 2024-09-18

## 2024-09-18 VITALS
OXYGEN SATURATION: 98 % | BODY MASS INDEX: 33.43 KG/M2 | RESPIRATION RATE: 17 BRPM | DIASTOLIC BLOOD PRESSURE: 100 MMHG | HEIGHT: 67 IN | WEIGHT: 213 LBS | HEART RATE: 73 BPM | SYSTOLIC BLOOD PRESSURE: 178 MMHG

## 2024-09-18 DIAGNOSIS — E78.5 HYPERLIPIDEMIA, UNSPECIFIED HYPERLIPIDEMIA TYPE: ICD-10-CM

## 2024-09-18 DIAGNOSIS — I10 ESSENTIAL HYPERTENSION: ICD-10-CM

## 2024-09-18 DIAGNOSIS — I10 HYPERTENSION, UNSPECIFIED TYPE: ICD-10-CM

## 2024-09-18 DIAGNOSIS — Z12.31 ENCOUNTER FOR SCREENING MAMMOGRAM FOR MALIGNANT NEOPLASM OF BREAST: ICD-10-CM

## 2024-09-18 DIAGNOSIS — Z12.4 SCREENING FOR CERVICAL CANCER: ICD-10-CM

## 2024-09-18 DIAGNOSIS — Z23 ENCOUNTER FOR IMMUNIZATION: ICD-10-CM

## 2024-09-18 DIAGNOSIS — E11.9 TYPE 2 DIABETES MELLITUS WITHOUT COMPLICATION, WITHOUT LONG-TERM CURRENT USE OF INSULIN (HCC): Primary | ICD-10-CM

## 2024-09-18 LAB
LEFT EYE DIABETIC RETINOPATHY: NORMAL
LEFT EYE IMAGE QUALITY: NORMAL
LEFT EYE MACULAR EDEMA: NORMAL
LEFT EYE OTHER RETINOPATHY: NORMAL
RIGHT EYE DIABETIC RETINOPATHY: NORMAL
RIGHT EYE IMAGE QUALITY: NORMAL
RIGHT EYE MACULAR EDEMA: NORMAL
RIGHT EYE OTHER RETINOPATHY: NORMAL
SEVERITY (EYE EXAM): NORMAL
SL AMB POCT HEMOGLOBIN AIC: 6.8 (ref ?–6.5)

## 2024-09-18 PROCEDURE — 90750 HZV VACC RECOMBINANT IM: CPT | Performed by: FAMILY MEDICINE

## 2024-09-18 PROCEDURE — 83036 HEMOGLOBIN GLYCOSYLATED A1C: CPT | Performed by: FAMILY MEDICINE

## 2024-09-18 PROCEDURE — 90471 IMMUNIZATION ADMIN: CPT | Performed by: FAMILY MEDICINE

## 2024-09-18 PROCEDURE — 99214 OFFICE O/P EST MOD 30 MIN: CPT | Performed by: FAMILY MEDICINE

## 2024-09-18 PROCEDURE — 99204 OFFICE O/P NEW MOD 45 MIN: CPT | Performed by: FAMILY MEDICINE

## 2024-09-18 RX ORDER — AMLODIPINE BESYLATE 5 MG/1
10 TABLET ORAL DAILY
Qty: 90 TABLET | Refills: 1 | Status: SHIPPED | OUTPATIENT
Start: 2024-09-18 | End: 2024-09-18

## 2024-09-18 RX ORDER — AMLODIPINE BESYLATE 5 MG/1
10 TABLET ORAL DAILY
Qty: 270 TABLET | Refills: 1 | Status: SHIPPED | OUTPATIENT
Start: 2024-09-18

## 2024-09-18 NOTE — ASSESSMENT & PLAN NOTE
Chronic.  Last lipid panel in Jan 2024 showed elevated triglycerides of 185 and HDL of 46.     Plan  Recheck lipid panel  Low- fat diet  Continue with Lipitor 20 mg daily    Orders:    Lipid Panel with Direct LDL reflex; Future    Lipid Panel with Direct LDL reflex

## 2024-09-18 NOTE — PROGRESS NOTES
Adult Annual Physical  Name: Najma Norris      : 1960      MRN: [id number]  Encounter Provider: Messi Dickson MD  Encounter Date: 2024   Encounter department: [facility]    Assessment & Plan  Type 2 diabetes mellitus without complication, without long-term current use of insulin (HCC)    Lab Results   Component Value Date    HGBA1C 6.8 (A) 2024     Chronic.  Last A1c was 6.4.  Patient is currently on metformin 500 mg.  She checks her blood pressures at home twice a day is usually in the range of 110-150.  Denies any symptoms of hypoglycemia or hyperglycemia  A1c during today's visit showed increased to 6.8    Plan  Continue with current dose of metformin 500 mg daily as A1c is close to target range  Follow-up diabetic eye exam results and albumin creatinine urine ratio  Recheck A1c in 3 months  Continue to check blood pressures at home  Handout given to patient regarding diabetic diet  Follow-up in 3 weeks, call clinic if blood sugars are less than 70 or greater than 250    Orders:    IRIS Diabetic eye exam    POCT hemoglobin A1c    Albumin / creatinine urine ratio    Encounter for immunization    Orders:    Zoster Vaccine Recombinant IM    Encounter for screening mammogram for malignant neoplasm of breast    Orders:    Mammo screening bilateral w 3d and cad; Future    Hyperlipidemia, unspecified hyperlipidemia type  Chronic.  Last lipid panel in 2024 showed elevated triglycerides of 185 and HDL of 46.     Plan  Recheck lipid panel  Low- fat diet  Continue with Lipitor 20 mg daily    Orders:    Lipid Panel with Direct LDL reflex; Future    Lipid Panel with Direct LDL reflex    Hypertension, unspecified type    Orders:    Comprehensive metabolic panel; Future    Comprehensive metabolic panel    Essential hypertension    Pt has chronic history of hypertension. She is currently on amlodipine 5 mg daily and hydrochlorothiazide 25 mg daily.  She  checks her blood pressures regularly at home.  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  She states her blood pressure readings are lower in the range of 130 -140 when she sleeps well.   Patient has some leg swelling- grade 1 edema on examination    Plan  Increase the dose of amlodipine to 10 mg daily.  Advised patient about side effects  Continue with hydrochlorothiazide current dose  Continue to check blood pressure readings at home and keep a log, bring log during next visit  Handout given to patient regarding DASH diet  Follow-up CMP as CMP 1 month ago showed elevated potassium of 5.5  Advised patient to call clinic if the leg swelling becomes worse. In case of worsening leg swelling, will discontinue amlodipine and switch to another medication       Screening for cervical cancer  Patient's last Pap smear was more than 3 years ago,  Patient to follow-up in 3 weeks       Immunizations and preventive care screenings were discussed with patient today. Appropriate education was printed on patient's after visit summary.    Counseling:  Alcohol/drug use: discussed moderation in alcohol intake, the recommendations for healthy alcohol use, and avoidance of illicit drug use.  Dental Health: discussed importance of regular tooth brushing, flossing, and dental visits.  Injury prevention: discussed safety/seat belts, safety helmets, smoke detectors, carbon dioxide detectors, and smoking near bedding or upholstery.  Sexual health: discussed sexually transmitted diseases, partner selection, use of condoms, avoidance of unintended pregnancy, and contraceptive alternatives.  Exercise: the importance of regular exercise/physical activity was discussed. Recommend exercise 3-5 times per week for at least 30 minutes.     BMI Counseling: Body mass index is 33.36 kg/m². The BMI is above normal. Nutrition recommendations include decreasing portion  sizes, encouraging healthy choices of fruits and vegetables, decreasing fast food intake, consuming healthier snacks, limiting drinks that contain sugar and moderation in carbohydrate intake. Exercise recommendations include moderate physical activity 150 minutes/week and exercising 3-5 times per week. Rationale for BMI follow-up plan is due to patient being overweight or obese.     Depression Screening and Follow-up Plan: Patient was screened for depression during today's encounter. They screened negative with a PHQ-2 score of 0.        History of Present Illness     Adult Annual Physical:  Patient presents for annual physical.     Diet and Physical Activity:  - Diet/Nutrition: well balanced diet.  - Exercise: walking.    Depression Screening:  - PHQ-2 Score: 0  - PHQ-9 Score: 8    General Health:  - Sleep: sleeps well. Sleeps during days, works  night shift  - Hearing: normal hearing bilateral ears.  - Vision: no vision problems.  - Dental: regular dental visits.    /GYN Health:  - Follows with GYN: no.   - Menopause: postmenopausal.   - History of STDs: no    Review of Systems   Constitutional: Negative.  Negative for appetite change, chills, fatigue and fever.   HENT: Negative.  Negative for congestion, ear pain, sinus pain and trouble swallowing.    Eyes: Negative.  Negative for photophobia, pain, discharge and redness.   Respiratory: Negative.  Negative for chest tightness, shortness of breath, wheezing and stridor.    Cardiovascular:  Positive for leg swelling.   Gastrointestinal: Negative.  Negative for abdominal pain, constipation, diarrhea, nausea and vomiting.   Genitourinary: Negative.  Negative for difficulty urinating, pelvic pain, urgency and vaginal bleeding.   Musculoskeletal: Negative.  Negative for back pain, gait problem and neck pain.   Neurological:  Positive for headaches. Negative for dizziness, speech difficulty and numbness.   Hematological: Negative.    Psychiatric/Behavioral: Negative.   Negative for sleep disturbance. The patient is not nervous/anxious.      Medical History Reviewed by provider this encounter:       Past Medical History   Past Medical History:   Diagnosis Date    Arthritis     Diabetes mellitus (HCC)     Gout     Hyperlipidemia     Hypertension      Past Surgical History:   Procedure Laterality Date    BILATERAL OOPHORECTOMY      HYSTERECTOMY  01/01/2015    OOPHORECTOMY Bilateral 2015    THYROID SURGERY      2001    UTERINE FIBROID SURGERY      5 taken out 2017 [facility]      Family History   Problem Relation Age of Onset    Prostate cancer Father 84    No Known Problems Mother     No Known Problems Daughter     No Known Problems Maternal Aunt     No Known Problems Sister     No Known Problems Sister      Current Outpatient Medications on File Prior to Visit   Medication Sig Dispense Refill    allopurinol (ZYLOPRIM) 300 mg tablet Take 1 tablet (300 mg total) by mouth daily 90 tablet 0    aspirin (ECOTRIN LOW STRENGTH) 81 mg EC tablet Take 81 mg by mouth daily Last dose 5/26/22      atorvastatin (LIPITOR) 20 mg tablet Take 1 tablet (20 mg total) by mouth daily 90 tablet 1    Blood Pressure Monitoring (SPHYGMOMANOMETER) MISC by Does not apply route daily 1 each 0    Cholecalciferol (Vitamin D3) 50 MCG (2000 UT) TABS Take 2,000 Units by mouth daily        hydrochlorothiazide (HYDRODIURIL) 25 mg tablet take 1 tablet by mouth once daily 90 tablet 1    ibuprofen (MOTRIN) 400 mg tablet Take 400 mg by mouth      meloxicam (MOBIC) 15 mg tablet Take 1 tablet by mouth daily as needed      metFORMIN (GLUCOPHAGE-XR) 500 mg 24 hr tablet Take 1 tablet (500 mg total) by mouth daily with dinner 90 tablet 0    Multiple Vitamins-Minerals (multivitamin with minerals) tablet Take 1 tablet by mouth daily Women's 50 plus daily 90 tablet 1    pantoprazole (PROTONIX) 40 mg tablet Take 1 tablet (40 mg total) by mouth daily Take pantoprazole twice a day for 2 weeks and then once a day 60  tablet 11    [DISCONTINUED] amLODIPine (NORVASC) 5 mg tablet take 1 tablet by mouth once daily 90 tablet 1     No current facility-administered medications on file prior to visit.     Allergies   Allergen Reactions    Other Allergic Rhinitis     pollen      Current Outpatient Medications on File Prior to Visit   Medication Sig Dispense Refill    allopurinol (ZYLOPRIM) 300 mg tablet Take 1 tablet (300 mg total) by mouth daily 90 tablet 0    aspirin (ECOTRIN LOW STRENGTH) 81 mg EC tablet Take 81 mg by mouth daily Last dose 22      atorvastatin (LIPITOR) 20 mg tablet Take 1 tablet (20 mg total) by mouth daily 90 tablet 1    Blood Pressure Monitoring (SPHYGMOMANOMETER) MISC by Does not apply route daily 1 each 0    Cholecalciferol (Vitamin D3) 50 MCG (2000 UT) TABS Take 2,000 Units by mouth daily        hydrochlorothiazide (HYDRODIURIL) 25 mg tablet take 1 tablet by mouth once daily 90 tablet 1    ibuprofen (MOTRIN) 400 mg tablet Take 400 mg by mouth      meloxicam (MOBIC) 15 mg tablet Take 1 tablet by mouth daily as needed      metFORMIN (GLUCOPHAGE-XR) 500 mg 24 hr tablet Take 1 tablet (500 mg total) by mouth daily with dinner 90 tablet 0    Multiple Vitamins-Minerals (multivitamin with minerals) tablet Take 1 tablet by mouth daily Women's 50 plus daily 90 tablet 1    pantoprazole (PROTONIX) 40 mg tablet Take 1 tablet (40 mg total) by mouth daily Take pantoprazole twice a day for 2 weeks and then once a day 60 tablet 11    [DISCONTINUED] amLODIPine (NORVASC) 5 mg tablet take 1 tablet by mouth once daily 90 tablet 1     No current facility-administered medications on file prior to visit.      Social History     Tobacco Use    Smoking status: Former     Current packs/day: 0.00     Types: Cigarettes     Quit date:      Years since quittin.7    Smokeless tobacco: Never   Vaping Use    Vaping status: Never Used   Substance and Sexual Activity    Alcohol use: No    Drug use: No    Sexual activity: Yes      "Partners: Male       Objective   BP (!) 178/100   Pulse 73   Resp 17   Ht 5' 7\" (1.702 m)   Wt 96.6 kg (213 lb)   SpO2 98%   BMI 33.36 kg/m²     Physical Exam  Vitals and nursing note reviewed.   Constitutional:       General: She is not in acute distress.     Appearance: She is well-developed.   HENT:      Head: Normocephalic and atraumatic.   Eyes:      Conjunctiva/sclera: Conjunctivae normal.   Cardiovascular:      Rate and Rhythm: Normal rate and regular rhythm.      Pulses: Normal pulses. no weak pulses.           Dorsalis pedis pulses are 2+ on the right side and 2+ on the left side.      Heart sounds: Normal heart sounds. No murmur heard.     No friction rub. No gallop.   Pulmonary:      Effort: Pulmonary effort is normal. No respiratory distress.      Breath sounds: Normal breath sounds. No stridor. No wheezing, rhonchi or rales.   Chest:      Chest wall: No tenderness.   Abdominal:      General: Bowel sounds are normal. There is no distension.      Palpations: Abdomen is soft. There is no mass.      Tenderness: There is no abdominal tenderness. There is no right CVA tenderness, left CVA tenderness, guarding or rebound.      Hernia: No hernia is present.   Musculoskeletal:         General: No swelling.      Cervical back: Neck supple.      Right lower leg: No edema.      Left lower leg: No edema.   Feet:      Right foot:      Skin integrity: No ulcer, skin breakdown, erythema, warmth, callus or dry skin.      Left foot:      Skin integrity: No ulcer, skin breakdown, erythema, warmth, callus or dry skin.   Skin:     General: Skin is warm and dry.      Capillary Refill: Capillary refill takes less than 2 seconds.   Neurological:      General: No focal deficit present.      Mental Status: She is alert and oriented to person, place, and time. Mental status is at baseline.   Psychiatric:         Mood and Affect: Mood normal.     Patient's shoes and socks removed.    Right Foot/Ankle   Right Foot " Inspection  Skin Exam: skin normal and skin intact. No dry skin, no warmth, no callus, no erythema, no maceration, no abnormal color, no pre-ulcer, no ulcer and no callus.     Toe Exam: ROM and strength within normal limits.     Sensory   Vibration: intact  Proprioception: intact  Monofilament testing: intact    Vascular  The right DP pulse is 2+.     Left Foot/Ankle  Left Foot Inspection  Skin Exam: skin normal and skin intact. No dry skin, no warmth, no erythema, no maceration, normal color, no pre-ulcer, no ulcer and no callus.     Toe Exam: ROM and strength within normal limits.     Sensory   Vibration: intact  Proprioception: intact  Monofilament testing: intact    Vascular  The left DP pulse is 2+.     Assign Risk Category  No deformity present  No loss of protective sensation  No weak pulses  Risk: 0

## 2024-09-18 NOTE — ASSESSMENT & PLAN NOTE
Lab Results   Component Value Date    HGBA1C 6.8 (A) 09/18/2024     Chronic.  Last A1c was 6.4.  Patient is currently on metformin 500 mg.  She checks her blood pressures at home twice a day is usually in the range of 110-150.  Denies any symptoms of hypoglycemia or hyperglycemia  A1c during today's visit showed increased to 6.8    Plan  Continue with current dose of metformin 500 mg daily as A1c is close to target range  Follow-up diabetic eye exam results and albumin creatinine urine ratio  Recheck A1c in 3 months  Continue to check blood pressures at home  Handout given to patient regarding diabetic diet  Follow-up in 3 weeks, call clinic if blood sugars are less than 70 or greater than 250    Orders:    IRIS Diabetic eye exam    POCT hemoglobin A1c    Albumin / creatinine urine ratio

## 2024-09-19 ENCOUNTER — APPOINTMENT (OUTPATIENT)
Dept: LAB | Facility: HOSPITAL | Age: 64
End: 2024-09-19
Payer: COMMERCIAL

## 2024-09-19 DIAGNOSIS — E08.00 DIABETES MELLITUS DUE TO UNDERLYING CONDITION WITH HYPEROSMOLARITY WITHOUT COMA, WITHOUT LONG-TERM CURRENT USE OF INSULIN (HCC): ICD-10-CM

## 2024-09-19 DIAGNOSIS — I10 ESSENTIAL HYPERTENSION, MALIGNANT: ICD-10-CM

## 2024-09-19 DIAGNOSIS — E78.5 HYPERLIPIDEMIA, UNSPECIFIED HYPERLIPIDEMIA TYPE: ICD-10-CM

## 2024-09-19 LAB
ALBUMIN SERPL BCG-MCNC: 4.3 G/DL (ref 3.5–5)
ALBUMIN/CREAT UR: 17 MG/G CREAT (ref 0–29)
ALP SERPL-CCNC: 87 U/L (ref 34–104)
ALT SERPL W P-5'-P-CCNC: 23 U/L (ref 7–52)
ANION GAP SERPL CALCULATED.3IONS-SCNC: 7 MMOL/L (ref 4–13)
AST SERPL W P-5'-P-CCNC: 20 U/L (ref 13–39)
BILIRUB SERPL-MCNC: 0.49 MG/DL (ref 0.2–1)
BUN SERPL-MCNC: 8 MG/DL (ref 5–25)
CALCIUM SERPL-MCNC: 9.4 MG/DL (ref 8.4–10.2)
CHLORIDE SERPL-SCNC: 101 MMOL/L (ref 96–108)
CHOLEST SERPL-MCNC: 174 MG/DL
CO2 SERPL-SCNC: 30 MMOL/L (ref 21–32)
CREAT SERPL-MCNC: 0.81 MG/DL (ref 0.6–1.3)
CREAT UR-MCNC: 413.6 MG/DL
GFR SERPL CREATININE-BSD FRML MDRD: 76 ML/MIN/1.73SQ M
GLUCOSE P FAST SERPL-MCNC: 140 MG/DL (ref 65–99)
HDLC SERPL-MCNC: 46 MG/DL
LDLC SERPL CALC-MCNC: 95 MG/DL (ref 0–100)
MICROALBUMIN UR-MCNC: 70.6 UG/ML
POTASSIUM SERPL-SCNC: 4 MMOL/L (ref 3.5–5.3)
PROT SERPL-MCNC: 7.4 G/DL (ref 6.4–8.4)
SODIUM SERPL-SCNC: 138 MMOL/L (ref 135–147)
TRIGL SERPL-MCNC: 164 MG/DL

## 2024-09-19 PROCEDURE — 36415 COLL VENOUS BLD VENIPUNCTURE: CPT

## 2024-09-19 PROCEDURE — 80061 LIPID PANEL: CPT

## 2024-09-19 PROCEDURE — 80053 COMPREHEN METABOLIC PANEL: CPT

## 2024-10-04 ENCOUNTER — ANNUAL EXAM (OUTPATIENT)
Age: 64
End: 2024-10-04

## 2024-10-04 VITALS
OXYGEN SATURATION: 97 % | SYSTOLIC BLOOD PRESSURE: 156 MMHG | BODY MASS INDEX: 33.3 KG/M2 | RESPIRATION RATE: 17 BRPM | HEART RATE: 68 BPM | TEMPERATURE: 98.9 F | WEIGHT: 212.6 LBS | DIASTOLIC BLOOD PRESSURE: 94 MMHG

## 2024-10-04 DIAGNOSIS — Z12.4 SCREENING FOR CERVICAL CANCER: ICD-10-CM

## 2024-10-04 DIAGNOSIS — I10 ESSENTIAL HYPERTENSION: ICD-10-CM

## 2024-10-04 DIAGNOSIS — M54.50 ACUTE BILATERAL LOW BACK PAIN, UNSPECIFIED WHETHER SCIATICA PRESENT: Primary | ICD-10-CM

## 2024-10-04 DIAGNOSIS — Z12.11 SCREENING FOR COLON CANCER: ICD-10-CM

## 2024-10-04 PROCEDURE — G0439 PPPS, SUBSEQ VISIT: HCPCS | Performed by: FAMILY MEDICINE

## 2024-10-04 PROCEDURE — G0145 SCR C/V CYTO,THINLAYER,RESCR: HCPCS

## 2024-10-04 RX ORDER — HYDROCHLOROTHIAZIDE 12.5 MG/1
12.5 TABLET ORAL DAILY
Qty: 30 TABLET | Refills: 5 | Status: SHIPPED | OUTPATIENT
Start: 2024-10-04 | End: 2025-04-02

## 2024-10-04 NOTE — PROGRESS NOTES
ASSESSMENT & PLAN: Najma Norris is a 64 y.o.  with normal gynecologic exam.    1.  Routine well woman exam done today  2.  Pap and HPV:  The patient's [unfilled]  Pap and cotesting was done today.  Current ASCCP Guidelines reviewed.   3.  Mammogram ordered during last visit  4.  Colonoscopy   4. The following were reviewed in today's visit: breast self exam, STD testing, HIV risk factors and prevention, use and side effects of OCPs, use and side effects of HRT, family planning choices, menopause, osteoporosis, adequate intake of calcium and vitamin D, exercise, healthy diet, tobacco cessation, colonoscopy discussed and ordered, and patient declined     5. Elevated blood pressure  Patient's blood pressure during today's examination was 156/94 mm Hg.  Her amlodipine dose was increased to 10 mg at last visit and she is on hydrochlorothiazide 25 mg.  She states her blood pressure readings at home are usually in the range of 130-150.  Asked with patient to take an additional hydrochlorothiazide 12.5 mg daily  Continue with amlodipine 10 mg daily  Check blood pressure readings at home and keep a log. Bring blood pressure readings and blood pressure cuff to next visit  Follow-up in 1 month      Problem List Items Addressed This Visit    None    CC:  Annual Gynecologic Examination    HPI: Najma Norris is a 64 y.o.  who presents for annual gynecologic examination.  She has the following concerns: Elevated blood pressure and low back pain    Health Maintenance:    She exercises 5 days per week with walking.    She wears her seatbelt routinely.    She does not perform regular monthly self breast exams.    She feels safe at home.   Patients does not follow a diet.      Patient never had a pap smear before  Last mammogram: 10/22/21  Last colonoscopy: 2022    Past Medical History:   Diagnosis Date    Arthritis     Diabetes mellitus (HCC)     Gout     Hyperlipidemia     Hypertension        Past Surgical  History:   Procedure Laterality Date    BILATERAL OOPHORECTOMY      HYSTERECTOMY  2015    OOPHORECTOMY Bilateral 2015    THYROID SURGERY      2001    UTERINE FIBROID SURGERY      5 taken out 2017 [facility]        Past OB/Gyn History:  OB History          3    Para   3    Term   3            AB        Living             SAB        IAB        Ectopic        Multiple        Live Births                    No LMP recorded. Patient is postmenopausal.   History of sexually transmitted infection : No  History of abnormal pap smears : No    Patient is not currently sexually active.      Family History   Problem Relation Age of Onset    Prostate cancer Father 84    No Known Problems Mother     No Known Problems Daughter     No Known Problems Maternal Aunt     No Known Problems Sister     No Known Problems Sister        Social History:  Social History     Socioeconomic History    Marital status: /Civil Union     Spouse name: Not on file    Number of children: Not on file    Years of education: Not on file    Highest education level: Not on file   Occupational History    Not on file   Tobacco Use    Smoking status: Former     Current packs/day: 0.00     Types: Cigarettes     Quit date:      Years since quittin.7    Smokeless tobacco: Never   Vaping Use    Vaping status: Never Used   Substance and Sexual Activity    Alcohol use: No    Drug use: No    Sexual activity: Yes     Partners: Male   Other Topics Concern    Not on file   Social History Narrative    Not on file     Social Determinants of Health     Financial Resource Strain: Low Risk  (2024)    Overall Financial Resource Strain (CARDIA)     Difficulty of Paying Living Expenses: Not very hard   Food Insecurity: No Food Insecurity (2024)    Hunger Vital Sign     Worried About Running Out of Food in the Last Year: Never true     Ran Out of Food in the Last Year: Never true   Transportation Needs: No  Transportation Needs (9/18/2024)    PRAPARE - Transportation     Lack of Transportation (Medical): No     Lack of Transportation (Non-Medical): No   Physical Activity: Not on file   Stress: Not on file   Social Connections: Not on file   Intimate Partner Violence: Not on file   Housing Stability: Low Risk  (9/18/2024)    Housing Stability Vital Sign     Unable to Pay for Housing in the Last Year: No     Number of Times Moved in the Last Year: 0     Homeless in the Last Year: No       Allergies   Allergen Reactions    Other Allergic Rhinitis     pollen       Current Outpatient Medications:     allopurinol (ZYLOPRIM) 300 mg tablet, Take 1 tablet (300 mg total) by mouth daily, Disp: 90 tablet, Rfl: 0    amLODIPine (NORVASC) 5 mg tablet, TAKE 2 TABLETS BY MOUTH DAILY, Disp: 270 tablet, Rfl: 1    aspirin (ECOTRIN LOW STRENGTH) 81 mg EC tablet, Take 81 mg by mouth daily Last dose 5/26/22, Disp: , Rfl:     atorvastatin (LIPITOR) 20 mg tablet, Take 1 tablet (20 mg total) by mouth daily, Disp: 90 tablet, Rfl: 1    Blood Pressure Monitoring (SPHYGMOMANOMETER) MISC, by Does not apply route daily, Disp: 1 each, Rfl: 0    Cholecalciferol (Vitamin D3) 50 MCG (2000 UT) TABS, Take 2,000 Units by mouth daily  , Disp: , Rfl:     hydrochlorothiazide (HYDRODIURIL) 25 mg tablet, take 1 tablet by mouth once daily, Disp: 90 tablet, Rfl: 1    ibuprofen (MOTRIN) 400 mg tablet, Take 400 mg by mouth, Disp: , Rfl:     meloxicam (MOBIC) 15 mg tablet, Take 1 tablet by mouth daily as needed, Disp: , Rfl:     metFORMIN (GLUCOPHAGE-XR) 500 mg 24 hr tablet, Take 1 tablet (500 mg total) by mouth daily with dinner, Disp: 90 tablet, Rfl: 0    Multiple Vitamins-Minerals (multivitamin with minerals) tablet, Take 1 tablet by mouth daily Women's 50 plus daily, Disp: 90 tablet, Rfl: 1    pantoprazole (PROTONIX) 40 mg tablet, Take 1 tablet (40 mg total) by mouth daily Take pantoprazole twice a day for 2 weeks and then once a day, Disp: 60 tablet, Rfl:  11    Review of Systems:  Review of Systems   Constitutional:  Negative for chills and fever.   HENT: Negative.  Negative for ear pain and sore throat.    Eyes:  Negative for pain and visual disturbance.   Respiratory: Negative.  Negative for cough and shortness of breath.    Cardiovascular: Negative.  Negative for chest pain and palpitations.   Gastrointestinal: Negative.  Negative for abdominal pain, constipation, diarrhea, nausea and vomiting.   Genitourinary: Negative.  Negative for difficulty urinating, dysuria and hematuria.   Musculoskeletal: Negative.  Negative for arthralgias and back pain.   Skin:  Negative for color change and rash.   Neurological: Negative.  Negative for dizziness, seizures, syncope and headaches.   Hematological: Negative.  Negative for adenopathy. Does not bruise/bleed easily.   Psychiatric/Behavioral: Negative.  Negative for sleep disturbance. The patient is not nervous/anxious.    All other systems reviewed and are negative.       Physical Exam:  Physical Exam  Vitals and nursing note reviewed.   Constitutional:       General: She is not in acute distress.     Appearance: She is well-developed.   HENT:      Head: Normocephalic and atraumatic.   Eyes:      Conjunctiva/sclera: Conjunctivae normal.   Cardiovascular:      Rate and Rhythm: Normal rate and regular rhythm.      Pulses: Normal pulses.      Heart sounds: Normal heart sounds. No murmur heard.     No friction rub. No gallop.   Pulmonary:      Effort: Pulmonary effort is normal. No respiratory distress.      Breath sounds: Normal breath sounds. No stridor. No wheezing, rhonchi or rales.   Chest:      Chest wall: No tenderness.   Abdominal:      General: Bowel sounds are normal. There is no distension.      Palpations: Abdomen is soft. There is no mass.      Tenderness: There is no abdominal tenderness. There is no right CVA tenderness, left CVA tenderness, guarding or rebound.      Hernia: No hernia is present.    Musculoskeletal:         General: No swelling.      Cervical back: Neck supple.      Right lower leg: No edema.      Left lower leg: No edema.   Skin:     General: Skin is warm and dry.      Capillary Refill: Capillary refill takes less than 2 seconds.   Neurological:      General: No focal deficit present.      Mental Status: She is alert and oriented to person, place, and time. Mental status is at baseline.   Psychiatric:         Mood and Affect: Mood normal.

## 2024-10-08 ENCOUNTER — TELEPHONE (OUTPATIENT)
Age: 64
End: 2024-10-08

## 2024-10-08 ENCOUNTER — PREP FOR PROCEDURE (OUTPATIENT)
Age: 64
End: 2024-10-08

## 2024-10-08 DIAGNOSIS — Z83.719 FAMILY HISTORY OF COLONIC POLYPS: Primary | ICD-10-CM

## 2024-10-08 NOTE — TELEPHONE ENCOUNTER
10/08/24  Screened by: Maria L Reeves MA    Referring Provider recall    Pre- Screening:     There is no height or weight on file to calculate BMI.  Has patient been referred for a routine screening Colonoscopy? yes  Is the patient between 45-75 years old? yes      Previous Colonoscopy    If yes:    Date: 2-9-2022    Facility: Dr. Waldrop    Reason: hx of colon polyps      SCHEDULING STAFF: If the patient is between 45yrs-49yrs, please advise patient to confirm benefits/coverage with their insurance company for a routine screening colonoscopy, some insurance carriers will only cover at 50yrs or older. If the patient is over 75years old, please schedule an office visit.     Does the patient want to see a Gastroenterologist prior to their procedure OR are they having any GI symptoms? no    Has the patient been hospitalized or had abdominal surgery in the past 6 months? no    Does the patient use supplemental oxygen? no    Does the patient take Coumadin, Lovenox, Plavix, Elliquis, Xarelto, or other blood thinning medication? no    Has the patient had a stroke, cardiac event, or stent placed in the past year? no    SCHEDULING STAFF: If patient answers NO to above questions, then schedule procedure. If patient answers YES to above questions, then schedule office appointment.     If patient is between 45yrs - 49yrs, please advise patient that we will have to confirm benefits & coverage with their insurance company for a routine screening colonoscopy.

## 2024-10-08 NOTE — TELEPHONE ENCOUNTER
Scheduled date of colonoscopy (as of today): 11-  Physician performing colonoscopy: Dr. Waldrop   Location of colonoscopy: [facility]   Bowel prep reviewed with patient: [email or web address]. Patient would like a copy of prep instruction sent by mail. Address verified. Thank you  Instructions reviewed with patient by: please send a copy of prep instruction by mail   Clearances: n/a

## 2024-10-10 LAB
LAB AP GYN PRIMARY INTERPRETATION: NORMAL
Lab: NORMAL

## 2024-10-28 ENCOUNTER — ANESTHESIA (OUTPATIENT)
Dept: ANESTHESIOLOGY | Facility: HOSPITAL | Age: 64
End: 2024-10-28

## 2024-10-28 ENCOUNTER — ANESTHESIA EVENT (OUTPATIENT)
Dept: ANESTHESIOLOGY | Facility: HOSPITAL | Age: 64
End: 2024-10-28

## 2024-11-07 ENCOUNTER — TELEPHONE (OUTPATIENT)
Dept: GASTROENTEROLOGY | Facility: CLINIC | Age: 64
End: 2024-11-07

## 2024-11-07 NOTE — TELEPHONE ENCOUNTER
lmom confirming pt's colonoscopy scheduled on 11/12/24 at [facility] with Dr Waldrop.  Informed [facility] would be calling the day prior with the arrival time.  Informed of clear liquid diet day prior as well as the bowel cleansing preparation.  Informed would need a  the day of the procedure due to being under sedation. I asked pt to please call back if has not received instructions or if has any questions.

## 2024-11-11 RX ORDER — SODIUM CHLORIDE, SODIUM LACTATE, POTASSIUM CHLORIDE, CALCIUM CHLORIDE 600; 310; 30; 20 MG/100ML; MG/100ML; MG/100ML; MG/100ML
75 INJECTION, SOLUTION INTRAVENOUS CONTINUOUS
OUTPATIENT
Start: 2024-11-11

## 2024-11-12 ENCOUNTER — TELEPHONE (OUTPATIENT)
Dept: GASTROENTEROLOGY | Facility: CLINIC | Age: 64
End: 2024-11-12

## 2024-11-12 NOTE — TELEPHONE ENCOUNTER
Per [facility], pt was a no show for colonoscopy scheduled today 11/12/24.  Will call pt to try to reschedule.

## 2024-11-15 NOTE — TELEPHONE ENCOUNTER
I lmom for pt to please call back to reschedule the colonoscopy that was missed with Dr Waldrop on 11/12/24.

## 2024-11-22 NOTE — TELEPHONE ENCOUNTER
I lmom for pt to please call back to reschedule the colonoscopy with Dr Waldrop. Will call again if do not hear back from pt.

## 2025-02-07 ENCOUNTER — OFFICE VISIT (OUTPATIENT)
Age: 65
End: 2025-02-07

## 2025-02-07 VITALS
HEIGHT: 67 IN | SYSTOLIC BLOOD PRESSURE: 140 MMHG | TEMPERATURE: 98 F | DIASTOLIC BLOOD PRESSURE: 90 MMHG | RESPIRATION RATE: 16 BRPM | HEART RATE: 65 BPM | WEIGHT: 210 LBS | BODY MASS INDEX: 32.96 KG/M2 | OXYGEN SATURATION: 98 %

## 2025-02-07 DIAGNOSIS — I10 PRIMARY HYPERTENSION: ICD-10-CM

## 2025-02-07 DIAGNOSIS — E11.9 TYPE 2 DIABETES MELLITUS WITHOUT COMPLICATION, WITHOUT LONG-TERM CURRENT USE OF INSULIN (HCC): ICD-10-CM

## 2025-02-07 DIAGNOSIS — Z59.9 FINANCIAL DIFFICULTIES: ICD-10-CM

## 2025-02-07 DIAGNOSIS — R60.0 LOWER EXTREMITY EDEMA: Primary | ICD-10-CM

## 2025-02-07 DIAGNOSIS — Z23 ENCOUNTER FOR IMMUNIZATION: ICD-10-CM

## 2025-02-07 PROBLEM — R13.10 DYSPHAGIA: Status: RESOLVED | Noted: 2021-12-08 | Resolved: 2025-02-07

## 2025-02-07 PROBLEM — E87.6 HYPOKALEMIA: Status: RESOLVED | Noted: 2023-04-28 | Resolved: 2025-02-07

## 2025-02-07 PROCEDURE — 99213 OFFICE O/P EST LOW 20 MIN: CPT | Performed by: FAMILY MEDICINE

## 2025-02-07 PROCEDURE — 90750 HZV VACC RECOMBINANT IM: CPT | Performed by: FAMILY MEDICINE

## 2025-02-07 PROCEDURE — 90471 IMMUNIZATION ADMIN: CPT | Performed by: FAMILY MEDICINE

## 2025-02-07 SDOH — ECONOMIC STABILITY - INCOME SECURITY: PROBLEM RELATED TO HOUSING AND ECONOMIC CIRCUMSTANCES, UNSPECIFIED: Z59.9

## 2025-02-08 NOTE — PROGRESS NOTES
Name: Najma Norris      : 1960      MRN: [id number]  Encounter Provider: Madyson Hendrickson MD  Encounter Date: 2025   Encounter department: [facility]  :  Assessment & Plan  Lower extremity edema  Chronic, stable. Left swelling> right. Patient said that this swelling is normal and not worse than usual  Could consider spironolactone in future but would want to be careful with patient's hx of hyperkalemia and hypokalemia. Cannot place labs at this time. Patient needs to speak with financial counselor and do the lab program up at the hospital.  Advised handful of nuts for increased protein, leg elevation.  Advised to go to ED if patient ever experiences symptoms of DVT such as one-sided erythema, one-sided pitting edema (especially if new-onset)       Primary hypertension  Cannot be assessed accurately at current time because patient is very nervous about cost bill she received this morning, no health insurance coverage.         Type 2 diabetes mellitus without complication, without long-term current use of insulin (HCC)  Held off on further testing due to financial concerns. Went to speak to financial counseling about health insurance           Financial difficulties  Spoke to financial counselor but patient would like to talk more about financial assistance  Orders:    Ambulatory Referral to Social Work Care Management Program; Future    Encounter for immunization    Orders:    Zoster Vaccine Recombinant IM      Patient plans to followup after financial concerns from today's visit are settled.  BMI Counseling: Body mass index is 32.89 kg/m². The BMI is above normal. Nutrition recommendations include increasing intake of lean protein.       History of Present Illness   See a/p      Review of Systems   Constitutional:  Negative for chills and fever.   HENT:  Negative for ear pain and sore throat.    Eyes:  Negative for pain and visual disturbance.   Respiratory:   "Negative for cough and shortness of breath.    Cardiovascular:  Positive for leg swelling (left>right. not new, chronic.). Negative for chest pain and palpitations.   Gastrointestinal:  Negative for abdominal pain and vomiting.   Genitourinary:  Negative for dysuria and hematuria.   Musculoskeletal:  Negative for arthralgias and back pain.   Skin:  Negative for color change and rash.   Neurological:  Negative for seizures and syncope.   Hematological:  Does not bruise/bleed easily.   Psychiatric/Behavioral:  The patient is nervous/anxious (due to situation where handed $300 bill at the beginning of visit).    All other systems reviewed and are negative.      Objective   /97 (BP Location: Left arm, Patient Position: Sitting, Cuff Size: Large)   Pulse 65   Temp 98 °F (36.7 °C) (Tympanic)   Resp 16   Ht 5' 7\" (1.702 m)   Wt 95.3 kg (210 lb)   SpO2 98%   BMI 32.89 kg/m²      Physical Exam  Vitals and nursing note reviewed.   Constitutional:       General: She is not in acute distress.     Appearance: She is well-developed.   HENT:      Head: Normocephalic and atraumatic.      Nose: No congestion or rhinorrhea.   Eyes:      Conjunctiva/sclera: Conjunctivae normal.   Cardiovascular:      Rate and Rhythm: Normal rate and regular rhythm.      Heart sounds: No murmur heard.  Pulmonary:      Effort: Pulmonary effort is normal. No respiratory distress.      Breath sounds: Normal breath sounds.   Abdominal:      Palpations: Abdomen is soft.      Tenderness: There is no abdominal tenderness.   Musculoskeletal:         General: No swelling.      Cervical back: Neck supple.      Right lower leg: Edema present.      Left lower leg: Edema (left>right chronic) present.   Skin:     General: Skin is warm and dry.   Neurological:      Mental Status: She is alert and oriented to person, place, and time.   Psychiatric:         Mood and Affect: Mood normal.         "

## 2025-02-08 NOTE — ASSESSMENT & PLAN NOTE
Chronic, stable. Left swelling> right. Patient said that this swelling is normal and not worse than usual  Could consider spironolactone in future but would want to be careful with patient's hx of hyperkalemia and hypokalemia. Cannot place labs at this time. Patient needs to speak with financial counselor and do the lab program up at the hospital.  Advised handful of nuts for increased protein, leg elevation.  Advised to go to ED if patient ever experiences symptoms of DVT such as one-sided erythema, one-sided pitting edema (especially if new-onset)

## 2025-02-08 NOTE — ASSESSMENT & PLAN NOTE
Cannot be assessed accurately at current time because patient is very nervous about cost bill she received this morning, no health insurance coverage.

## 2025-02-08 NOTE — ASSESSMENT & PLAN NOTE
Held off on further testing due to financial concerns. Went to speak to financial counseling about health insurance            INR is now therapeutic. This is a quick follow-up after a sub-therapeutic INR on 9/18. Continue dose and diet until follow-up. Patient reports no bleeding or bruising, no new medications and no diet changes.  I reminded the patient to call with any problems, changes or questions before the next visit.

## 2025-02-13 ENCOUNTER — TELEPHONE (OUTPATIENT)
Age: 65
End: 2025-02-13

## 2025-02-13 NOTE — TELEPHONE ENCOUNTER
----- Message from Madyson Hendrickson MD sent at 2/7/2025 10:09 PM EST -----  Hello Could you please schedule this patient for an appointment in 3-6 months for annaul physical? Patient has only met me and Dr. Dickson so far - I believe, so hopefully she can choose a provider whom she feels comfortable with

## 2025-03-21 ENCOUNTER — PATIENT OUTREACH (OUTPATIENT)
Age: 65
End: 2025-03-21

## 2025-03-21 NOTE — PROGRESS NOTES
SWCM received referral from provider to assist patient with needs, SDOH; financial.    SWCM completed chart review. SWCM called patient to follow up and assist with needs. SWCM unable to reach patient. Left voice message requesting return call. Contact information provided.      SWCM will continue to follow up and remain available to assist as needed.

## 2025-03-31 ENCOUNTER — PATIENT OUTREACH (OUTPATIENT)
Age: 65
End: 2025-03-31

## 2025-03-31 NOTE — LETTER
03/31/25    Dear Najma Norris,    I am a Care Manager with [address]  [facility] [address] [phone].  We have made several attempts to call you by phone.  It is important that you contact us back at [phone] so that we can assist with your care needs.     Sincerely,     Deena Cruz LCSW  Social Work Care Manager

## 2025-04-30 DIAGNOSIS — I10 ESSENTIAL HYPERTENSION: ICD-10-CM

## 2025-05-01 ENCOUNTER — TELEPHONE (OUTPATIENT)
Age: 65
End: 2025-05-01

## 2025-05-01 DIAGNOSIS — I10 ESSENTIAL HYPERTENSION: ICD-10-CM

## 2025-05-01 RX ORDER — AMLODIPINE BESYLATE 5 MG/1
10 TABLET ORAL DAILY
Qty: 270 TABLET | Refills: 1 | Status: SHIPPED | OUTPATIENT
Start: 2025-05-01

## 2025-05-01 RX ORDER — AMLODIPINE BESYLATE 5 MG/1
10 TABLET ORAL DAILY
Qty: 270 TABLET | Refills: 1 | Status: SHIPPED | OUTPATIENT
Start: 2025-05-01 | End: 2025-05-01 | Stop reason: SDUPTHER

## 2025-05-01 NOTE — TELEPHONE ENCOUNTER
I resent the prescription to [facility] pharmacy.  I spoke with [facility] and the treatment went through Attempting to maintain the trunk as perpendicular as possible to the floor, eyes focused ahead, and feet flat on the floor, the subject slowly lowers his body by flexing his knees    Return to the Emergency Department for inability to move legs, worsening of pain, tingling / loss of sensation, any other care or concern.

## 2025-05-16 ENCOUNTER — TELEPHONE (OUTPATIENT)
Dept: OTHER | Facility: OTHER | Age: 65
End: 2025-05-16

## 2025-05-16 NOTE — TELEPHONE ENCOUNTER
Patient called in requesting an appointment to recheck her blood pressure on Monday. Appt was booked for 5/19/25 0930.

## 2025-05-19 ENCOUNTER — OFFICE VISIT (OUTPATIENT)
Age: 65
End: 2025-05-19

## 2025-05-19 VITALS
WEIGHT: 208 LBS | BODY MASS INDEX: 31.52 KG/M2 | HEART RATE: 78 BPM | OXYGEN SATURATION: 98 % | HEIGHT: 68 IN | TEMPERATURE: 98.2 F | DIASTOLIC BLOOD PRESSURE: 88 MMHG | RESPIRATION RATE: 20 BRPM | SYSTOLIC BLOOD PRESSURE: 122 MMHG

## 2025-05-19 DIAGNOSIS — I10 ESSENTIAL HYPERTENSION: Primary | ICD-10-CM

## 2025-05-19 PROCEDURE — 99213 OFFICE O/P EST LOW 20 MIN: CPT | Performed by: FAMILY MEDICINE

## 2025-05-19 RX ORDER — LISINOPRIL AND HYDROCHLOROTHIAZIDE 12.5; 2 MG/1; MG/1
1 TABLET ORAL DAILY
Qty: 30 TABLET | Refills: 5 | Status: SHIPPED | OUTPATIENT
Start: 2025-05-19

## 2025-05-19 NOTE — LETTER
May 19, 2025     Patient: Najma Norris  YOB: 1960  Date of Visit: 5/19/2025      To Whom it May Concern:    Najma Norris is under my professional care. Najma was seen in my office on 5/19/2025. Najma's blood pressure was not elevated in office today, 122/88. Medications were adjusted due to side effects of peripheral edema and will continue to monitor.     If you have any questions or concerns, please don't hesitate to call.         Sincerely,          Kylee Lockwood MD        CC: No Recipients

## 2025-05-19 NOTE — PROGRESS NOTES
Name: Najma Norris      : 1960      MRN: [id number]  Encounter Provider: Kylee Lockwood MD  Encounter Date: 2025   Encounter department: [facility]  :  Assessment & Plan  Essential hypertension  Chronic     BP in office 142/92, and 122/82 on re-check.   States that she is stressed with her job.   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   Did physical for job and blood pressure was found to be elevated.   Home medication hydrochlorothiazide 12.5 mg daily, amlodipine 5 mg which she is compliant with.   She states that she is working on diet and low-salt.   No formal exercise.     Will discontinue amlodipine and add lisinopril 20 mg   Begin combo pill of lisinopril 20-HCTZ 12.5 mg   DASH diet   Encouraged patient to do cardio a couple times a week   Provided note for work that patient was seen today, BP was not elevated and medications were adjusted due to side effects, will continue to monitor.   Follow up in 2 weeks           BMI Counseling: Body mass index is 31.63 kg/m². The BMI is above normal. Nutrition recommendations include decreasing portion sizes, encouraging healthy choices of fruits and vegetables, decreasing fast food intake, consuming healthier snacks, increasing intake of lean protein and reducing intake of saturated and trans fat. Exercise recommendations include exercising 3-5 times per week. Rationale for BMI follow-up plan is due to patient being overweight or obese.       History of Present Illness   Here today to follow up hypertension.       Review of Systems   Constitutional:  Negative for chills and fever.   HENT:  Negative for ear pain and sore throat.    Eyes:  Negative for pain and visual disturbance.   Respiratory:  Negative for cough and shortness of breath.    Cardiovascular:  Negative for chest pain and palpitations.   Gastrointestinal:  Negative for abdominal pain and vomiting.  "  Genitourinary:  Negative for dysuria and hematuria.   Musculoskeletal:  Negative for arthralgias and back pain.   Skin:  Negative for color change and rash.   Neurological:  Negative for seizures and syncope.   All other systems reviewed and are negative.      Objective   /92 (BP Location: Left arm, Patient Position: Sitting, Cuff Size: Standard)   Pulse 78   Temp 98.2 °F (36.8 °C) (Tympanic)   Resp 20   Ht 5' 8\" (1.727 m)   Wt 94.3 kg (208 lb)   SpO2 98%   BMI 31.63 kg/m²      Physical Exam  Constitutional:       Appearance: Normal appearance.   HENT:      Head: Normocephalic and atraumatic.      Mouth/Throat:      Mouth: Mucous membranes are moist.     Eyes:      General:         Right eye: No discharge.         Left eye: No discharge.      Conjunctiva/sclera: Conjunctivae normal.       Cardiovascular:      Rate and Rhythm: Normal rate and regular rhythm.      Pulses: Normal pulses.      Heart sounds: Normal heart sounds.   Pulmonary:      Effort: Pulmonary effort is normal. No respiratory distress.      Breath sounds: Normal breath sounds.   Abdominal:      General: Bowel sounds are normal.      Palpations: Abdomen is soft.      Tenderness: There is no abdominal tenderness.     Musculoskeletal:      Cervical back: Neck supple.      Right lower leg: No edema.      Left lower leg: No edema.     Skin:     General: Skin is warm and dry.      Capillary Refill: Capillary refill takes less than 2 seconds.     Neurological:      Mental Status: She is alert.       "

## 2025-05-20 ENCOUNTER — TELEPHONE (OUTPATIENT)
Dept: ADMINISTRATIVE | Facility: OTHER | Age: 65
End: 2025-05-20

## 2025-05-20 NOTE — LETTER
Procedure Request Form: Colonoscopy      Date Requested: 25  Patient: Najma Norris  Patient : 1960   Referring Provider: Messi Dickson MD        Date of Procedure ______________________________       The above patient has informed us that they have completed their   most recent Colonoscopy at your facility. Please complete   this form and attach all corresponding procedure reports/results.    Comments __________________________________________________________  ____________________________________________________________________  ____________________________________________________________________  ____________________________________________________________________    Facility Completing Procedure _________________________________________    Form Completed By (print name) _______________________________________      Signature __________________________________________________________      These reports are needed for  compliance.    Please fax this completed form and a copy of the procedure report to the [facility] Based Department as soon as possible via Fax [phone], raquel Aavlos: Phone [phone]. Our office is located at [address] [place] [address].    We thank you for your assistance in treating our mutual patient.

## 2025-05-20 NOTE — TELEPHONE ENCOUNTER
----- Message from Carley DUMONT sent at 5/19/2025  2:07 PM EDT -----  Regarding: care gap request  05/19/25 2:07 PMHello, our patient attached above has had Colonoscopy completed/performed. Please assist in updating the patient chart by [facility].  Does not remember which office inside the hospital it was completed at.remember the name of the The date of service is November 20th 2024.Thank you,Carley Rizo, GLENDY MARKSamaritan HospitalSONALI GALEAS

## 2025-05-21 NOTE — TELEPHONE ENCOUNTER
Upon review of the In Basket request and the patient's chart, initial outreach has been made via fax to facility. Please see Contacts section for details.     Thank you  Bailey Ch

## 2025-05-22 NOTE — TELEPHONE ENCOUNTER
Upon review of the In Basket request we were able to locate, review, and update the patient chart as requested for CRC: Colonoscopy.    Any additional questions or concerns should be emailed to the Practice Liaisons via the appropriate education email address, please do not reply via In Basket.    Thank you  Bailey Ch [facility]

## 2025-05-29 DIAGNOSIS — I10 ESSENTIAL HYPERTENSION: ICD-10-CM

## 2025-05-29 RX ORDER — HYDROCHLOROTHIAZIDE 12.5 MG/1
12.5 TABLET ORAL DAILY
Qty: 30 TABLET | Refills: 5 | Status: SHIPPED | OUTPATIENT
Start: 2025-05-29

## 2025-06-05 ENCOUNTER — OFFICE VISIT (OUTPATIENT)
Age: 65
End: 2025-06-05

## 2025-06-05 VITALS
DIASTOLIC BLOOD PRESSURE: 98 MMHG | HEIGHT: 68 IN | WEIGHT: 209 LBS | BODY MASS INDEX: 31.67 KG/M2 | HEART RATE: 69 BPM | OXYGEN SATURATION: 99 % | SYSTOLIC BLOOD PRESSURE: 163 MMHG

## 2025-06-05 DIAGNOSIS — I10 PRIMARY HYPERTENSION: ICD-10-CM

## 2025-06-05 DIAGNOSIS — E11.9 TYPE 2 DIABETES MELLITUS WITHOUT COMPLICATION, WITHOUT LONG-TERM CURRENT USE OF INSULIN (HCC): Primary | ICD-10-CM

## 2025-06-05 DIAGNOSIS — E78.5 HYPERLIPIDEMIA, UNSPECIFIED HYPERLIPIDEMIA TYPE: ICD-10-CM

## 2025-06-05 LAB — SL AMB POCT HEMOGLOBIN AIC: 7.1 (ref ?–6.5)

## 2025-06-05 PROCEDURE — 83036 HEMOGLOBIN GLYCOSYLATED A1C: CPT | Performed by: FAMILY MEDICINE

## 2025-06-05 PROCEDURE — 99214 OFFICE O/P EST MOD 30 MIN: CPT | Performed by: FAMILY MEDICINE

## 2025-06-05 RX ORDER — METFORMIN HYDROCHLORIDE 500 MG/1
500 TABLET, EXTENDED RELEASE ORAL
Qty: 90 TABLET | Refills: 0 | Status: SHIPPED | OUTPATIENT
Start: 2025-06-05

## 2025-06-05 RX ORDER — OLMESARTAN MEDOXOMIL AND HYDROCHLOROTHIAZIDE 40/12.5 40; 12.5 MG/1; MG/1
1 TABLET ORAL DAILY
Qty: 30 TABLET | Refills: 2 | Status: SHIPPED | OUTPATIENT
Start: 2025-06-05

## 2025-06-05 NOTE — ASSESSMENT & PLAN NOTE
Last lipid panel 8 months ago showed cholesterol of 174, triglycerides of 164, HDL 46.   Patient is currently on atorvastatin 20 mg daily.    Follow-up repeat lipid panel  Orders:    Lipid panel; Future

## 2025-06-05 NOTE — ASSESSMENT & PLAN NOTE
Chronic. She is currently on lisinopril 20-HCTZ 12.5 mg. She checks her blood pressures regularly at home.  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  She states her blood pressure readings are lower in the range of 130 -140 when she sleeps well.   Patient has some leg swelling - grade 1 edema on examination    Blood pressure during today's visit on the higher side 160/98 mm Hg. Repeat blood pressure similar value     Plan  Switched lisinopril hydrochlorothiazide to Benicar hydrochlorothiazide 40-12.5  Continue to check blood pressure readings at home and keep a log, vies patient to check her blood pressures in the evening after she sleeps  Handout given to patient regarding DASH diet  Follow-up BMP, albumin creatinine ratio and lipid panel  Discontinue amlodipine  Follow-up in 1 month  Orders:    Albumin / creatinine urine ratio; Future    Basic metabolic panel; Future    olmesartan-hydrochlorothiazide (BENICAR HCT) 40-12.5 MG per tablet; Take 1 tablet by mouth daily

## 2025-06-05 NOTE — PROGRESS NOTES
Name: Najma Norris      : 1960      MRN: [id number]  Encounter Provider: Messi Dickson MD  Encounter Date: 2025   Encounter department: [facility]  :  Assessment & Plan  Type 2 diabetes mellitus without complication, without long-term current use of insulin ([facility])    Lab Results   Component Value Date    HGBA1C 7.1 (A) 2025     Chronic.  Last A1c was 6.8.  Patient is currently on metformin 500 mg twice a day.  She reports that she is compliant with diabetic diet and walks up to half an hour every day.  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     A1c during  today's visit increased to 7.1    Increase metformin to 1000 mg once in the morning and 500 mg at night  Continue with diabetic diet  Follow-up in 1 month for annual physical  Orders:    POCT hemoglobin A1c    metFORMIN (GLUCOPHAGE) 1000 MG tablet; Take 1 tablet (1,000 mg total) by mouth daily with breakfast    metFORMIN (GLUCOPHAGE-XR) 500 mg 24 hr tablet; Take 1 tablet (500 mg total) by mouth daily with dinner    Primary hypertension  Chronic. She is currently on lisinopril 20-HCTZ 12.5 mg. She checks her blood pressures regularly at home.  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  She states her blood pressure readings are lower in the range of 130 -140 when she sleeps well.   Patient has some leg swelling - grade 1 edema on examination    Blood pressure during today's visit on the higher side 160/98 mm Hg. Repeat blood pressure similar value     Plan  Switched lisinopril hydrochlorothiazide to Benicar hydrochlorothiazide 40-12.5  Continue to check blood pressure readings at home and keep a log, vies patient to check her blood pressures in the evening after she sleeps  Handout given to patient regarding DASH diet  Follow-up BMP, albumin creatinine ratio and lipid panel  Discontinue  "amlodipine  Follow-up in 1 month  Orders:    Albumin / creatinine urine ratio; Future    Basic metabolic panel; Future    olmesartan-hydrochlorothiazide (BENICAR HCT) 40-12.5 MG per tablet; Take 1 tablet by mouth daily    Hyperlipidemia, unspecified hyperlipidemia type  Last lipid panel 8 months ago showed cholesterol of 174, triglycerides of 164, HDL 46.   Patient is currently on atorvastatin 20 mg daily.    Follow-up repeat lipid panel  Orders:    Lipid panel; Future           History of Present Illness   Cyndee Arredondo is a 64-year-old female with past medical history of hypertension, type 2 diabetes, hyperlipidemia presenting to the clinic for follow-up of hypertension.  She states that her home blood pressure readings continue to be on the higher side.  Her systolic blood pressure at home is in the range of 130-160.  She states that she checks her blood pressure after coming back from work in the morning.  She has been working night shifts.  She states that she moved to a new job from July and will work in the evenings from 3 to 11 PM.  She denies any other complaints today      Review of Systems   Constitutional:  Negative for chills and fever.   HENT:  Negative for ear pain and sore throat.    Eyes:  Negative for pain and visual disturbance.   Respiratory:  Negative for cough and shortness of breath.    Cardiovascular:  Negative for chest pain and palpitations.   Gastrointestinal:  Negative for abdominal pain and vomiting.   Genitourinary:  Negative for dysuria and hematuria.   Musculoskeletal:  Negative for arthralgias and back pain.   Skin:  Negative for color change and rash.   Neurological:  Negative for seizures and syncope.   Psychiatric/Behavioral:  Negative for sleep disturbance. The patient is not nervous/anxious.    All other systems reviewed and are negative.      Objective   /98 (BP Location: Left arm, Patient Position: Sitting, Cuff Size: Standard)   Pulse 69   Ht 5' 8\" (1.727 m)   Wt " 94.8 kg (209 lb)   SpO2 99%   BMI 31.78 kg/m²      Physical Exam  Vitals and nursing note reviewed.   Constitutional:       General: She is not in acute distress.     Appearance: She is well-developed.   HENT:      Head: Normocephalic and atraumatic.     Eyes:      Conjunctiva/sclera: Conjunctivae normal.       Cardiovascular:      Rate and Rhythm: Normal rate and regular rhythm.      Heart sounds: No murmur heard.  Pulmonary:      Effort: Pulmonary effort is normal. No respiratory distress.      Breath sounds: Normal breath sounds.   Abdominal:      Palpations: Abdomen is soft.      Tenderness: There is no abdominal tenderness.     Musculoskeletal:         General: No swelling.      Cervical back: Neck supple.      Left lower leg: Edema present.     Skin:     General: Skin is warm and dry.      Capillary Refill: Capillary refill takes less than 2 seconds.     Neurological:      Mental Status: She is alert.     Psychiatric:         Mood and Affect: Mood normal.

## 2025-06-05 NOTE — PATIENT INSTRUCTIONS
"Patient Education     Low-sodium diet   The Basics   Written by the doctors and editors at [facility]   What is sodium? -- This is the main ingredient in table salt. It is also found in lots of foods. The body needs a very small amount of sodium to work normally, but most people eat much more sodium than their body needs.  Who should eat less sodium? -- Nearly everyone eats too much sodium. The average American takes in 3400 milligrams of sodium each day. Experts say that most people should have no more than 2300 milligrams a day.  Some people with certain health conditions should follow a low-sodium diet. Ask your doctor how much sodium you should have.  Why should I eat less sodium? -- Reducing the amount of sodium you eat can have lots of health benefits:   It can lower your blood pressure. This means that it can help lower your risk of stroke, heart attack, kidney damage, and lots of other health problems.   It can reduce the amount of fluid in your body, which means that your heart doesn't have to work as hard.   It can keep the kidneys from having to work too hard. This is especially important in people who have kidney disease.   It can reduce swelling in the ankles and belly, which can be uncomfortable and make it hard to move.   It can lower the chances of forming kidney stones.   It can help keep your bones strong.  Which foods have the most sodium? -- Processed foods have the most sodium. These foods usually come in cans, boxes, jars, and bags. They tend to have a lot of sodium even if they don't taste salty. In fact, many sweet foods have a lot of sodium in them. The only way to know for sure how much sodium is in a food is to check the label (figure 1).  Here are some examples of foods that often have too much sodium:   Canned soups   Rice and noodle mixes   Sauces, dressings, and condiments (such as ketchup and mustard)   Pre-made frozen meals (also called \"TV dinners\")   Deli meats, hot dogs, and " "cheeses   Smoked, cured, or pickled foods   Salted snack foods and nuts   Restaurant meals  What should I do to reduce the amount of sodium in my diet? -- Many people think that eating a low-sodium diet just means not adding salt to their food. But this is not true. Not adding salt at the table or when cooking will help a little. But almost all of the sodium you eat is already in the food you buy at the grocery store or at restaurants (figure 2).  Here are some tips to help you eat less sodium:   Avoid processed foods when possible. This is the most important thing you can do to eat less sodium. Processed foods include most foods that are sold in cans, boxes, jars, and bags.   Instead of buying pre-made, processed foods, buy fresh or fresh-frozen fruits and vegetables. (\"Fresh-frozen\" means that the food is frozen without anything added to it.)   Buy meats, fish, chicken, and turkey that are fresh instead of canned or sold at the deli counter. (Meats sold at the deli counter are high in sodium.)   Try to eat at restaurants less often.   When possible, try to make meals from scratch at home using fresh ingredients.   If you do buy canned or packaged foods, choose ones that are labeled \"sodium free\" or \"very low sodium\" (table 1). Or choose foods that have less than 400 milligrams of sodium in each serving. The amount of sodium in each serving appears on the nutrition label (figure 1).  The table has some examples of foods to avoid and foods to choose instead (table 2).  Whatever changes you make, make them slowly. Choose 1 thing to do differently, and do that for a while. If you can keep doing that change easily, add another change. For instance, if you usually eat green beans from a can, try buying fresh or fresh-frozen green beans and cooking them at home without adding salt. If that works for you, keep doing it. Then, choose another thing to change.  If you try making a change and it doesn't work right away, don't " "give up. See if you can reduce sodium in other ways. The important thing is to take small steps and to keep doing the changes that work for you.  Can I still eat out at restaurants sometimes? -- One of best ways to limit your sodium is to only eat out at restaurants every once in a while. When you do eat out, try to choose places that offer healthier choices and fresh ingredients.  No matter where you eat, when choosing your food:   Ask your  if your meal can be made without salt.   Avoid foods that come with sauces or dips.   Choose plain grilled meats or fish and steamed vegetables.   Ask for oil and vinegar for your salad, rather than dressing.   If a meal you really want has more sodium than you should have, consider saving half of it to eat another day.  What if food just does not taste as good without sodium? -- Starting a low-sodium diet can be hard. The good news is that your taste buds can get used to having less sodium. But you have to give them some time to adjust.  It can also help to try other ways to add flavor to your foods. Try things like herbs, spices, lemon juice, and vinegar.  What about salt substitutes? -- Flavoring your food with a salt substitute is a good way to reduce how much salt you eat. But check with your doctor or nurse before trying this. Some salt substitutes can be dangerous if you have certain health problems or take certain medicines.  Do medicines have sodium? -- Yes, some medicines contain sodium. If you are buying medicines you can get without a prescription, look to see how much sodium they have. Avoid products that have \"sodium carbonate\" or \"sodium bicarbonate\" unless your doctor prescribes them. (Sodium bicarbonate is baking soda.)  All topics are updated as new evidence becomes available and our peer review process is complete.  This topic retrieved from [employer] on: Mar 22, 2024.  Topic 80574 Version 14.0  Release: 32.2.4 - C32.80  © 2024 UpToDate, Inc. and/or its " "affiliates. All rights reserved.  figure 1: Food labels can be tricky     To figure out how much sodium you are eating, check the label to find out how much sodium is in 1 serving. If you are having more than 1 serving, multiply that amount by the number of servings you plan to eat. For instance, if you are going to eat this whole can of soup, you should multiply 850 by 2. That means that you will be having 1700 milligrams of sodium. That's more sodium than many people are supposed to have in 1 day.  Graphic 19601 Version 8.0  figure 2: Sources of sodium in your diet     Graphic 98287 Version 2.0  table 1: A guide to common nutrient claims and what they mean  Salt/sodium-free  Less than 5 mg of sodium per serving   Very low sodium  35 mg or less of sodium per serving   Low sodium  140 mg or less of sodium per serving   Reduced sodium  At least 25% less sodium than the regular product   Light or lite in sodium  At least 50% less sodium than the regular product   No salt added or unsalted  No salt is added during processing, but these products may not be salt/sodium-free unless stated   mg: milligram; %: percent.  Graphic 49708 Version 7.0  table 2: Ways to cut down on salt (sodium)  Avoid these foods  Try these foods instead    Cured and smoked foods like mo, sausage, smoked fish and meats, hot dogs, ham, lunch meats, corned beef, and pickles Fresh turkey, chicken, and lean beef   Canned fish (tuna, sardines) Unsalted tuna or sardines   Canned meats Fresh unprocessed meats, vegetable protein, and fish  Frozen and canned meats, vegetable protein, and fish that are labeled \"low-sodium\"   Salted pretzels, crackers, potato chips, tortilla chips, and nuts Low-sodium and unsalted versions of these foods   Most cheeses Low-sodium cheeses (check label for actual sodium content)   Sauces (tomato and cream, etc), tomato juices Low-sodium versions of these foods, such as low-sodium tomato juice   Processed, instant, and " "convenience foods like frozen dinners, packaged meals, canned soups, and boxed pasta blends Cook and freeze your own low-sodium meals, soups, and broths    If you do need to use convenience or processed foods, read the labels. Choose items with 140 to 200 mg of sodium per serving.    For an entire convenience meal (frozen dinner), try to find options with less than 500 to 600 mg of sodium.    If you used canned foods, look for those labeled \"sodium-free.\" Or you can rinse the canned food under water to lower the sodium content.   Fast foods and foods prepared at restaurants (unless without cheese, sauces, or added salt) Fresh foods and foods with sauces on the side  Request that food be prepared without cheese or added salt   Graphic 44299 Version 10.0  Consumer Information Use and Disclaimer   Disclaimer: This generalized information is a limited summary of diagnosis, treatment, and/or medication information. It is not meant to be comprehensive and should be used as a tool to help the user understand and/or assess potential diagnostic and treatment options. It does NOT include all information about conditions, treatments, medications, side effects, or risks that may apply to a specific patient. It is not intended to be medical advice or a substitute for the medical advice, diagnosis, or treatment of a health care provider based on the health care provider's examination and assessment of a patient's specific and unique circumstances. Patients must speak with a health care provider for complete information about their health, medical questions, and treatment options, including any risks or benefits regarding use of medications. This information does not endorse any treatments or medications as safe, effective, or approved for treating a specific patient. UpToDate, Inc. and its affiliates disclaim any warranty or liability relating to this information or the use thereof.The use of this information is governed by the " "Terms of Use, available at https://www.w[employer]tersApprionuwer.com/en/know/clinical-effectiveness-terms. 2024© UpToDate, Inc. and its affiliates and/or licensors. All rights reserved.  Copyright   © 2024 UpToDate, Inc. and/or its affiliates. All rights reserved.    Patient Education     Controlling your blood pressure through lifestyle   The Basics   Written by the doctors and editors at [employer]   What does my lifestyle have to do with my blood pressure? -- The things you do and the foods you eat have a big effect on your blood pressure and your overall health. Following the right lifestyle can:   Lower your blood pressure, or keep you from getting high blood pressure in the first place   Reduce your need for blood pressure medicines   Make medicines for high blood pressure work better, if you do take them   Lower the chances that you'll have a heart attack or stroke, or develop kidney disease  Which lifestyle choices will help lower my blood pressure? -- You can:   Lose weight (if you are overweight).   Choose a diet rich in fruits, vegetables, and low-fat dairy products, and low in meats, sweets, and refined grains.   Eat less salt (sodium).   Do something active for at least 30 minutes a day on most days of the week.   Limit the amount of alcohol you drink.  If you have high blood pressure, it's also very important to quit smoking (if you smoke). Quitting smoking might not bring your blood pressure down. But it will lower the chances that you'll have a heart attack or stroke, and it will help you feel better and live longer.  Start low, and go slow -- The changes listed above might sound like a lot, but don't worry. You don't have to change everything all at once. The key to improving your lifestyle is to \"start low, and go slow.\" Choose 1 small, specific thing to change, and try doing it for a while. If it works for you, keep doing it until it becomes a habit. If it doesn't, don't give up. Choose something else to " "change, and see how that goes.  Let's say, for example, that you would like to improve your diet. If you're the type of person who eats cheeseburgers and French fries often, you can't switch to eating just salads from one day to the next. When people try to make changes like that, they often fail. Then, they feel frustrated and tend to give up. So instead of trying to change everything about your diet in 1 day, change 1 or 2 small things about your diet and give yourself time to get used to those changes. For instance, keep the cheeseburger but give up the French fries. Or eat the same things, but cut your portions in half.  As you find things that you are able to change and stick with, keep adding new changes. In time, you will see that you can actually change a lot. You just have to get used to the changes slowly.  Lose weight -- When people think about losing weight, they sometimes make it more complicated than it really is. To lose weight, you have to either eat less or move more. If you do both of those things, it's even better. But there is no single weight loss diet or activity that's better than any other. When it comes to weight loss, the most effective plan is the one that you'll stick with.  Improve your diet -- There is no single diet that is right for everyone. But in general, a healthy diet can include:   Lots of fruits, vegetables, and whole grains   Some beans, peas, lentils, chickpeas, and similar foods   Some nuts, such as walnuts, almonds, and peanuts   Fat-free or low-fat milk and milk products   Some fish  To have a healthy diet, it's also important to limit or avoid sugar, sweets, meats, and refined grains. (Refined grains are found in white bread, white rice, most forms of pasta, and most packaged \"snack\" foods.)  Reduce salt -- Many people think that eating a low-sodium diet means avoiding the salt shaker and not adding salt when cooking. The truth is, not adding salt at the table or when you " "cook will only help a little. Almost all of the sodium you eat is already in the food you buy at the grocery store or at restaurants (figure 1).  The most important thing you can do to cut down on sodium is to eat less processed food. That means that you should avoid most foods that are sold in cans, boxes, jars, and bags. You should also eat in restaurants less often.  To reduce the amount of sodium you eat, buy fresh or fresh-frozen fruits, vegetables, and meats. (Fresh-frozen foods have had nothing added to them before freezing.) Then, you can make meals at home, from scratch, with these ingredients.  As with the other changes, don't try to cut out salt all at once. Instead, choose 1 or 2 foods that have a lot of sodium and try to replace them with low-sodium choices. When you get used to those low-sodium options, find another food or 2 to change. Then, keep going, until all of the foods you eat are sodium-free or low in sodium.  Become more active -- If you want to be more active, you don't have to go to the gym or get all sweaty. It is possible to increase your activity level while doing everyday things you enjoy. Walking, gardening, and dancing are just a few of the things that you might try. As with all the other changes, the key is not to do too much too fast. If you don't do any activity now, start by walking for just a few minutes every other day. Do that for a few weeks. If you stick with it, try doing it for longer. But if you find that you don't like walking, try a different activity.  Drink less alcohol -- If you are female, do not have more than 1 \"standard drink\" of alcohol a day. If you are male, do not have more than 2. A \"standard drink\" is:   A can or bottle that has 12 ounces of beer   A glass that has 5 ounces of wine   A shot that has 1.5 ounces of hard alcohol  Where should I start? -- If you want to improve your lifestyle, start by making the changes that you think would be easiest for you. " "If you used to exercise and just got out of the habit, maybe it would be easy for you to start exercising again. Or if you actually like cooking meals from scratch, maybe the first thing you should focus on is eating home-cooked meals that are low in sodium.  Whatever you tackle first, choose specific, realistic goals, and give yourself a deadline. For example, do not decide that you are going to \"exercise more.\" Instead, decide that you are going to walk for 10 minutes on Monday, Wednesday, and Friday, and that you are going to do this for the next 2 weeks.  When lifestyle changes are too general, people have a hard time following through.  Now go. You can do it!  All topics are updated as new evidence becomes available and our peer review process is complete.  This topic retrieved from [employer] on: Feb 28, 2024.  Topic 52117 Version 15.0  Release: 32.2.4 - C32.58  © 2024 UpToDate, Inc. and/or its affiliates. All rights reserved.  figure 1: Sources of sodium in your diet     Graphic 17288 Version 2.0  Consumer Information Use and Disclaimer   Disclaimer: This generalized information is a limited summary of diagnosis, treatment, and/or medication information. It is not meant to be comprehensive and should be used as a tool to help the user understand and/or assess potential diagnostic and treatment options. It does NOT include all information about conditions, treatments, medications, side effects, or risks that may apply to a specific patient. It is not intended to be medical advice or a substitute for the medical advice, diagnosis, or treatment of a health care provider based on the health care provider's examination and assessment of a patient's specific and unique circumstances. Patients must speak with a health care provider for complete information about their health, medical questions, and treatment options, including any risks or benefits regarding use of medications. This information does not endorse any " "treatments or medications as safe, effective, or approved for treating a specific patient. UpToDate, Inc. and its affiliates disclaim any warranty or liability relating to this information or the use thereof.The use of this information is governed by the Terms of Use, available at https://www.[employer]/en/know/clinical-effectiveness-terms. 2024© UpToDate, Inc. and its affiliates and/or licensors. All rights reserved.  Copyright   © 2024 UpToDate, Inc. and/or its affiliates. All rights reserved.    Patient Education     DASH diet   The Basics   Written by the doctors and editors at [employer]   What is the DASH diet? -- DASH stands for \"dietary approaches to stop hypertension.\" It is an eating plan that can help lower blood pressure. It can also help prevent high blood pressure, which doctors call \"hypertension.\" You don't need special foods or recipes to follow the DASH diet. It is more about eating certain types of foods in certain amounts.  The DASH diet has lots of fruits and vegetables, whole grains, lean meats, healthy fats, and low-fat or fat-free dairy products (figure 1). It is low in saturated fats, trans fats, cholesterol, added sugars, and sodium (salt).  The standard DASH diet limits sodium to no more than 2300 mg a day. Your doctor or nurse can talk to you about what your specific goals should be.  Why do I need the DASH diet? -- The DASH diet can help you:   Lower your blood pressure and cholesterol   Lower your risk for cancer, heart disease, heart attack, and stroke. It might also lower your risk for heart failure, kidney stones, and diabetes.   Lose weight or keep a healthy weight  What can I eat and drink on the DASH diet? -- Below are some guidelines and examples for your daily and weekly nutrition goals. These are based on a 2000-calorie-per-day eating plan.  Daily goals:   Grains - Try to eat 6 to 8 servings of whole-grain, high-fiber foods each day. Examples of a serving include 1 slice " "of bread, 1 ounce (30 grams) of dry cereal, or 1/2 cup (120 grams) of cooked cereal, pasta, or brown rice.   Fruits - Try to eat 4 to 5 servings of fruit each day. Examples of a serving include 1 medium fruit or 1/2 cup (75 grams) of fresh, frozen, or canned fruit. Try to eat different kinds and colors. Frozen or canned fruit should not have added sugar. Look for frozen or canned fruits with 100 percent fruit juice or water.   Vegetables - Try to eat 4 to 5 servings of vegetables each day. Examples of a serving include 1 cup (40 grams) of leafy greens or 1/2 cup (75 grams) of fresh or cooked vegetables. Try to pick many kinds and colors. If you buy canned vegetables, look for \"low sodium\" or \"salt free.\" Buy plain, frozen vegetables to avoid added fat and sodium.   Dairy - Try to eat 2 to 3 servings of fat-free or low-fat milk products each day. Examples of a serving include 1 cup (240 mL) of milk or yogurt or 1.5 ounces (45 grams) of cheese.   Lean meats, poultry, and seafood - Try to eat 6 or fewer servings of lean meat, poultry, and seafood each day. Examples of a serving include 1 egg or 1 ounce (30 grams) of cooked meat, poultry, or fish. Try to choose more low-fat or lean meats like chicken, fish, or turkey. Eat less red meat.   Fats and oils - Try to eat 2 to 3 servings of fats and oils each day. Examples of a serving include 1 teaspoon (5 mL) of soft margarine or vegetable oil, or 1 tablespoon (18 grams) of mayonnaise. Eat healthy fats like those found in fish, nuts, and avocados. Try using olive oil or vegetable oils such as canola oil. You can also try corn, safflower, sunflower, or soybean oils. Use low-sodium and low-fat salad dressing and mayonnaise.  Weekly goals:   Nuts, seeds, and legumes (dry beans and peas) - Try to eat 4 to 5 servings each week. Examples of a serving include 1/3 cup (45 grams) of nuts, 2 tablespoons (50 grams) of nut butter or seeds, or 1/2 cup (75 grams) of cooked legumes. Try " almonds and walnuts, sunflower seeds, peanut or other nut butters, soybeans, lentils, kidney beans, and split peas.   Sweets - Try to eat fewer than 5 servings each week. Examples of a serving include 1 tablespoon (14 grams) of sugar or jelly, or 1/2 cup (120 grams) of gelatin. Choose low-fat and trans fat-free desserts. These include fruit-flavored gelatin, sorbet, jellybeans, berta crackers, animal crackers, low-fat fig bars, and ric snaps. Eat fruit to satisfy the desire for sweets.  To add flavor, use pepper, herbs, spices, vinegar, or lemon or lime juices. Choose low-sodium or salt-free products whenever you can. This is especially important for foods like broths, soups, or soy sauce.  What foods and drinks should I avoid on the DASH diet?    Grains to avoid - Salted breads, rolls, crackers, quick breads, self-rising flours, biscuit mixes, regular breadcrumbs, instant hot cereals, commercially prepared rice, pasta, stuffing mixes.   Fruits and vegetables to avoid - Store-bought prepared potatoes and vegetable mixes, regular canned vegetables and juices, vegetables frozen with sauce, pickled vegetables, processed fruits with salt or sodium.   Dairy products to avoid - Whole milk, malted milk, chocolate milk, buttermilk, full-fat cheese, ice cream.   Meats to avoid - Smoked, cured, salted, or canned fish such as sardines or anchovies. High-fat cuts of meat like beef, lamb, pork, mo and sausage, and chicken with the skin on it.   Fats and oils to avoid - Eat fewer solid fats like butter, lard, and hard stick margarine. Eat less saturated fat, trans fat, and total fat.   Condiments and snacks to avoid - Salted and canned peas, beans, and olives. Salted snack foods, fried foods, soda, other sweetened drinks.   Sweets to avoid - High-fat baked goods such as muffins, donuts, pastries, and commercial baked goods. Candy bars.   Alcohol - If you choose to drink alcohol, limit the amount. Most doctors recommend  limiting alcohol to no more than 1 drink a day (for females) or 2 drinks a day (for males).  What else do I need to know?    Get regular physical activity to make this diet help you even more. Even gentle forms of activity, like walking, are good for your health.   Try baking or broiling instead of frying foods.   Write down the foods that you eat. This will help you track what you have eaten each week.   When you go to the grocery store, have a list or a meal plan. Don't shop when you are hungry, since this might lead you to buy more unhealthy foods.   Read food labels with care (figure 2). They show you how much is in a serving. The amount is given as a percentage of the total amount that you need each day. Reading labels helps you make healthy food choices.  All topics are updated as new evidence becomes available and our peer review process is complete.  This topic retrieved from [employer] on: Feb 26, 2024.  Topic 230553 Version 1.0  Release: 32.2.4 - C32.56  © 2024 UpToDate, Inc. and/or its affiliates. All rights reserved.  figure 1: DASH diet     Graphic 205247 Version 1.0  figure 2: Food label     Graphic 246683 Version 1.0  Consumer Information Use and Disclaimer   Disclaimer: This generalized information is a limited summary of diagnosis, treatment, and/or medication information. It is not meant to be comprehensive and should be used as a tool to help the user understand and/or assess potential diagnostic and treatment options. It does NOT include all information about conditions, treatments, medications, side effects, or risks that may apply to a specific patient. It is not intended to be medical advice or a substitute for the medical advice, diagnosis, or treatment of a health care provider based on the health care provider's examination and assessment of a patient's specific and unique circumstances. Patients must speak with a health care provider for complete information about their health, medical  "questions, and treatment options, including any risks or benefits regarding use of medications. This information does not endorse any treatments or medications as safe, effective, or approved for treating a specific patient. UpToDate, Inc. and its affiliates disclaim any warranty or liability relating to this information or the use thereof.The use of this information is governed by the Terms of Use, available at https://www.[employer].com/en/know/clinical-effectiveness-terms. 2024© UpToDate, Inc. and its affiliates and/or licensors. All rights reserved.  Copyright   © 2024 UpToDate, Inc. and/or its affiliates. All rights reserved.    Patient Education     High blood pressure in adults   The Basics   Written by the doctors and editors at [employer]   What is high blood pressure? -- High blood pressure is a condition that puts you at risk for heart attack, stroke, and kidney disease. It does not usually cause symptoms. But it can be serious.  When your doctor or nurse tells you your blood pressure, they say 2 numbers. For instance, your doctor or nurse might say that your blood pressure is \"130 over 80.\" The top number is the pressure inside your arteries when your heart is [place]. The bottom number is the pressure inside your arteries when your heart is relaxed.  \"Elevated blood pressure\" is a term doctors or nurses use as a warning. People with elevated blood pressure do not yet have high blood pressure. But their blood pressure is not as low as it should be for good health.  Many experts define high, elevated, and normal blood pressure as follows:   High - Top number of 130 or above and/or bottom number of 80 or above.   Elevated - Top number between 120 and 129 and bottom number of 79 or below.   Normal - Top number of 119 or below and bottom number of 79 or below.  This information is also in the table (table 1).  How can I lower my blood pressure? -- If your doctor or nurse prescribed blood pressure " "medicine, the most important thing you can do is to take it. If it causes side effects, do not just stop taking it. Instead, talk to your doctor or nurse about the problems it causes. They might be able to lower your dose or switch you to another medicine. If cost is a problem, mention that, too. They might be able to put you on a less expensive medicine. Taking your blood pressure medicine can keep you from having a heart attack or stroke, and it can save your life!  Can I do anything on my own? -- You have a lot of control over your blood pressure. To lower it:   Lose weight (if you are overweight).   Choose a diet low in fat and rich in fruits, vegetables, and low-fat dairy products.   Eat less salt.   Do something active for at least 30 minutes a day on most days of the week.   Drink less alcohol (if you drink more than 2 alcoholic drinks per day).  It's also a good idea to get a home blood pressure meter. People who check their own blood pressure at home do better at keeping it low and can sometimes even reduce the amount of medicine they take.  All topics are updated as new evidence becomes available and our peer review process is complete.  This topic retrieved from [employer] on: Feb 26, 2024.  Topic 58015 Version 23.0  Release: 32.2.4 - C32.56  © 2024 UpToDate, Inc. and/or its affiliates. All rights reserved.  table 1: Definition of normal and high blood pressure  Level  Top number  Bottom number    High 130 or above 80 or above   Elevated 120 to 129 79 or below   Normal 119 or below 79 or below   These definitions are from the American College of Cardiology/American Heart Association. Other expert groups might use slightly different definitions.  \"Elevated blood pressure\" is a term doctor or nurses use as a warning. It means you do not yet have high blood pressure, but your blood pressure is not as low as it should be for good health.  Graphic 13529 Version 6.0  Consumer Information Use and Disclaimer "   Disclaimer: This generalized information is a limited summary of diagnosis, treatment, and/or medication information. It is not meant to be comprehensive and should be used as a tool to help the user understand and/or assess potential diagnostic and treatment options. It does NOT include all information about conditions, treatments, medications, side effects, or risks that may apply to a specific patient. It is not intended to be medical advice or a substitute for the medical advice, diagnosis, or treatment of a health care provider based on the health care provider's examination and assessment of a patient's specific and unique circumstances. Patients must speak with a health care provider for complete information about their health, medical questions, and treatment options, including any risks or benefits regarding use of medications. This information does not endorse any treatments or medications as safe, effective, or approved for treating a specific patient. UpToDate, Inc. and its affiliates disclaim any warranty or liability relating to this information or the use thereof.The use of this information is governed by the Terms of Use, available at https://www.[employer].com/en/know/clinical-effectiveness-terms. 2024© UpToDate, Inc. and its affiliates and/or licensors. All rights reserved.  Copyright   © 2024 UpToDate, Inc. and/or its affiliates. All rights reserved.

## 2025-06-05 NOTE — ASSESSMENT & PLAN NOTE
Lab Results   Component Value Date    HGBA1C 7.1 (A) 06/05/2025     Chronic.  Last A1c was 6.8.  Patient is currently on metformin 500 mg twice a day.  She reports that she is compliant with diabetic diet and walks up to half an hour every day.  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     A1c during  today's visit increased to 7.1    Increase metformin to 1000 mg once in the morning and 500 mg at night  Continue with diabetic diet  Follow-up in 1 month for annual physical  Orders:    POCT hemoglobin A1c    metFORMIN (GLUCOPHAGE) 1000 MG tablet; Take 1 tablet (1,000 mg total) by mouth daily with breakfast    metFORMIN (GLUCOPHAGE-XR) 500 mg 24 hr tablet; Take 1 tablet (500 mg total) by mouth daily with dinner

## 2025-06-07 ENCOUNTER — TELEPHONE (OUTPATIENT)
Dept: OTHER | Facility: OTHER | Age: 65
End: 2025-06-07

## 2025-06-07 NOTE — TELEPHONE ENCOUNTER
Megha @ [facility] pharmacy called, they would like to get clarification on rx's. States olmesartan-hydrochlorothiazide  was sent in but recently a rx for   hydroCHLOROthiazide  was prescribed and they would like to verify if that medication was discontinued. She stated she didn't need this paged to the on-call, is ok to send to office for Monday. Please call [phone].

## 2025-06-11 NOTE — TELEPHONE ENCOUNTER
Called [facility] pharmacy to clarify patient's medications. Informed pharmacy that per Dr. Dickson, patient should be taking the olmesartan-hydrochlorothiazide only. Separate hydrochlorothiazide Rx is no longer needed and will be discontinued.     Dr. Dickson - can you please discontinue the hydrochlorothiazide order since it is no longer necessary? Thank you.

## 2025-06-12 NOTE — TELEPHONE ENCOUNTER
Sure I discontinue the hydrochlorothiazide and patient will be on olmesartan hydrochlorothiazide only

## 2025-07-09 ENCOUNTER — APPOINTMENT (OUTPATIENT)
Dept: LAB | Facility: HOSPITAL | Age: 65
End: 2025-07-09
Payer: COMMERCIAL

## 2025-07-09 DIAGNOSIS — I10 PRIMARY HYPERTENSION: ICD-10-CM

## 2025-07-09 DIAGNOSIS — E78.5 HYPERLIPIDEMIA, UNSPECIFIED HYPERLIPIDEMIA TYPE: ICD-10-CM

## 2025-07-09 LAB
ANION GAP SERPL CALCULATED.3IONS-SCNC: 6 MMOL/L (ref 4–13)
BUN SERPL-MCNC: 12 MG/DL (ref 5–25)
CALCIUM SERPL-MCNC: 8.9 MG/DL (ref 8.4–10.2)
CHLORIDE SERPL-SCNC: 107 MMOL/L (ref 96–108)
CHOLEST SERPL-MCNC: 165 MG/DL (ref ?–200)
CO2 SERPL-SCNC: 27 MMOL/L (ref 21–32)
CREAT SERPL-MCNC: 0.82 MG/DL (ref 0.6–1.3)
CREAT UR-MCNC: 165.3 MG/DL
GFR SERPL CREATININE-BSD FRML MDRD: 75 ML/MIN/1.73SQ M
GLUCOSE P FAST SERPL-MCNC: 136 MG/DL (ref 65–99)
HDLC SERPL-MCNC: 43 MG/DL
LDLC SERPL CALC-MCNC: 94 MG/DL (ref 0–100)
MICROALBUMIN UR-MCNC: 22.3 MG/L
MICROALBUMIN/CREAT 24H UR: 13 MG/G CREATININE (ref 0–30)
NONHDLC SERPL-MCNC: 122 MG/DL
POTASSIUM SERPL-SCNC: 3.5 MMOL/L (ref 3.5–5.3)
SODIUM SERPL-SCNC: 140 MMOL/L (ref 135–147)
TRIGL SERPL-MCNC: 141 MG/DL (ref ?–150)

## 2025-07-09 PROCEDURE — 82570 ASSAY OF URINE CREATININE: CPT

## 2025-07-09 PROCEDURE — 80061 LIPID PANEL: CPT

## 2025-07-09 PROCEDURE — 80048 BASIC METABOLIC PNL TOTAL CA: CPT

## 2025-07-09 PROCEDURE — 36415 COLL VENOUS BLD VENIPUNCTURE: CPT

## 2025-07-09 PROCEDURE — 82043 UR ALBUMIN QUANTITATIVE: CPT

## 2025-08-11 ENCOUNTER — OFFICE VISIT (OUTPATIENT)
Age: 65
End: 2025-08-11